# Patient Record
Sex: MALE | Race: WHITE
[De-identification: names, ages, dates, MRNs, and addresses within clinical notes are randomized per-mention and may not be internally consistent; named-entity substitution may affect disease eponyms.]

---

## 2019-11-13 ENCOUNTER — HOSPITAL ENCOUNTER (INPATIENT)
Dept: HOSPITAL 25 - ED | Age: 54
LOS: 4 days | Discharge: HOME | DRG: 174 | End: 2019-11-17
Attending: INTERNAL MEDICINE | Admitting: HOSPITALIST
Payer: COMMERCIAL

## 2019-11-13 DIAGNOSIS — Z79.01: ICD-10-CM

## 2019-11-13 DIAGNOSIS — Z79.02: ICD-10-CM

## 2019-11-13 DIAGNOSIS — Z91.14: ICD-10-CM

## 2019-11-13 DIAGNOSIS — D68.61: ICD-10-CM

## 2019-11-13 DIAGNOSIS — I73.9: ICD-10-CM

## 2019-11-13 DIAGNOSIS — Z28.21: ICD-10-CM

## 2019-11-13 DIAGNOSIS — R73.03: ICD-10-CM

## 2019-11-13 DIAGNOSIS — I95.9: ICD-10-CM

## 2019-11-13 DIAGNOSIS — F32.9: ICD-10-CM

## 2019-11-13 DIAGNOSIS — Z91.041: ICD-10-CM

## 2019-11-13 DIAGNOSIS — I25.42: ICD-10-CM

## 2019-11-13 DIAGNOSIS — I25.10: ICD-10-CM

## 2019-11-13 DIAGNOSIS — J44.9: ICD-10-CM

## 2019-11-13 DIAGNOSIS — R07.89: ICD-10-CM

## 2019-11-13 DIAGNOSIS — I21.09: Primary | ICD-10-CM

## 2019-11-13 DIAGNOSIS — Z95.820: ICD-10-CM

## 2019-11-13 DIAGNOSIS — Z80.1: ICD-10-CM

## 2019-11-13 DIAGNOSIS — E78.5: ICD-10-CM

## 2019-11-13 DIAGNOSIS — I10: ICD-10-CM

## 2019-11-13 DIAGNOSIS — F17.210: ICD-10-CM

## 2019-11-13 DIAGNOSIS — I51.9: ICD-10-CM

## 2019-11-13 LAB
ALBUMIN SERPL BCG-MCNC: 4.3 G/DL (ref 3.2–5.2)
ALBUMIN/GLOB SERPL: 1.3 {RATIO} (ref 1–3)
ALP SERPL-CCNC: 52 U/L (ref 34–104)
ALT SERPL W P-5'-P-CCNC: 23 U/L (ref 7–52)
ANION GAP SERPL CALC-SCNC: 8 MMOL/L (ref 2–11)
APTT PPP: 29 SECONDS (ref 26–38)
AST SERPL-CCNC: 20 U/L (ref 13–39)
BASOPHILS # BLD AUTO: 0.1 10^3/UL (ref 0–0.2)
BUN SERPL-MCNC: 17 MG/DL (ref 6–24)
BUN/CREAT SERPL: 13.9 (ref 8–20)
CALCIUM SERPL-MCNC: 9.6 MG/DL (ref 8.6–10.3)
CHLORIDE SERPL-SCNC: 100 MMOL/L (ref 101–111)
CHOLEST SERPL-MCNC: 248 MG/DL
CK MB SERPL-MCNC: 2.9 NG/ML (ref 0.6–6.3)
CK SERPL-CCNC: 79 U/L (ref 10–223)
EOSINOPHIL # BLD AUTO: 0.3 10^3/UL (ref 0–0.6)
GLOBULIN SER CALC-MCNC: 3.3 G/DL (ref 2–4)
GLUCOSE SERPL-MCNC: 135 MG/DL (ref 70–100)
HCO3 SERPL-SCNC: 26 MMOL/L (ref 22–32)
HCT VFR BLD AUTO: 38 % (ref 42–52)
HDLC SERPL-MCNC: 25 MG/DL
HGB BLD-MCNC: 13.2 G/DL (ref 14–18)
INR PPP/BLD: 1 (ref 0.82–1.09)
LYMPHOCYTES # BLD AUTO: 3 10^3/UL (ref 1–4.8)
MCH RBC QN AUTO: 31 PG (ref 27–31)
MCHC RBC AUTO-ENTMCNC: 35 G/DL (ref 31–36)
MCV RBC AUTO: 90 FL (ref 80–94)
MONOCYTES # BLD AUTO: 1.2 10^3/UL (ref 0–0.8)
NEUTROPHILS # BLD AUTO: 5.6 10^3/UL (ref 1.5–7.7)
NRBC # BLD AUTO: 0 10^3/UL
NRBC BLD QL AUTO: 0
PLATELET # BLD AUTO: 318 10^3/UL (ref 150–450)
POTASSIUM SERPL-SCNC: 3.4 MMOL/L (ref 3.5–5)
PROT SERPL-MCNC: 7.6 G/DL (ref 6.4–8.9)
RBC # BLD AUTO: 4.25 10^6 /UL (ref 4.18–5.48)
SODIUM SERPL-SCNC: 134 MMOL/L (ref 135–145)
TRIGL SERPL-MCNC: 254 MG/DL
TROPONIN I SERPL-MCNC: 0.09 NG/ML (ref ?–0.04)
WBC # BLD AUTO: 10.2 10^3/UL (ref 3.5–10.8)

## 2019-11-13 PROCEDURE — C1769 GUIDE WIRE: HCPCS

## 2019-11-13 PROCEDURE — 80048 BASIC METABOLIC PNL TOTAL CA: CPT

## 2019-11-13 PROCEDURE — 99157 MOD SED OTHER PHYS/QHP EA: CPT

## 2019-11-13 PROCEDURE — 82550 ASSAY OF CK (CPK): CPT

## 2019-11-13 PROCEDURE — 83880 ASSAY OF NATRIURETIC PEPTIDE: CPT

## 2019-11-13 PROCEDURE — 85610 PROTHROMBIN TIME: CPT

## 2019-11-13 PROCEDURE — 87641 MR-STAPH DNA AMP PROBE: CPT

## 2019-11-13 PROCEDURE — C1887 CATHETER, GUIDING: HCPCS

## 2019-11-13 PROCEDURE — 83874 ASSAY OF MYOGLOBIN: CPT

## 2019-11-13 PROCEDURE — C1876 STENT, NON-COA/NON-COV W/DEL: HCPCS

## 2019-11-13 PROCEDURE — 82553 CREATINE MB FRACTION: CPT

## 2019-11-13 PROCEDURE — 85025 COMPLETE CBC W/AUTO DIFF WBC: CPT

## 2019-11-13 PROCEDURE — 93005 ELECTROCARDIOGRAM TRACING: CPT

## 2019-11-13 PROCEDURE — 027035Z DILATION OF CORONARY ARTERY, ONE ARTERY WITH TWO DRUG-ELUTING INTRALUMINAL DEVICES, PERCUTANEOUS APPROACH: ICD-10-PCS | Performed by: INTERNAL MEDICINE

## 2019-11-13 PROCEDURE — C1725 CATH, TRANSLUMIN NON-LASER: HCPCS

## 2019-11-13 PROCEDURE — 83605 ASSAY OF LACTIC ACID: CPT

## 2019-11-13 PROCEDURE — 93306 TTE W/DOPPLER COMPLETE: CPT

## 2019-11-13 PROCEDURE — B2111ZZ FLUOROSCOPY OF MULTIPLE CORONARY ARTERIES USING LOW OSMOLAR CONTRAST: ICD-10-PCS | Performed by: INTERNAL MEDICINE

## 2019-11-13 PROCEDURE — 83735 ASSAY OF MAGNESIUM: CPT

## 2019-11-13 PROCEDURE — 99285 EMERGENCY DEPT VISIT HI MDM: CPT

## 2019-11-13 PROCEDURE — 99223 1ST HOSP IP/OBS HIGH 75: CPT

## 2019-11-13 PROCEDURE — 84484 ASSAY OF TROPONIN QUANT: CPT

## 2019-11-13 PROCEDURE — 85730 THROMBOPLASTIN TIME PARTIAL: CPT

## 2019-11-13 PROCEDURE — 99156 MOD SED OTH PHYS/QHP 5/>YRS: CPT

## 2019-11-13 PROCEDURE — 80053 COMPREHEN METABOLIC PANEL: CPT

## 2019-11-13 PROCEDURE — 36415 COLL VENOUS BLD VENIPUNCTURE: CPT

## 2019-11-13 PROCEDURE — 71045 X-RAY EXAM CHEST 1 VIEW: CPT

## 2019-11-13 PROCEDURE — 85347 COAGULATION TIME ACTIVATED: CPT

## 2019-11-13 PROCEDURE — 83721 ASSAY OF BLOOD LIPOPROTEIN: CPT

## 2019-11-13 PROCEDURE — 4A023N7 MEASUREMENT OF CARDIAC SAMPLING AND PRESSURE, LEFT HEART, PERCUTANEOUS APPROACH: ICD-10-PCS | Performed by: INTERNAL MEDICINE

## 2019-11-13 PROCEDURE — 80061 LIPID PANEL: CPT

## 2019-11-13 PROCEDURE — 81003 URINALYSIS AUTO W/O SCOPE: CPT

## 2019-11-13 PROCEDURE — 83036 HEMOGLOBIN GLYCOSYLATED A1C: CPT

## 2019-11-13 RX ADMIN — FENTANYL CITRATE PRN MCG: 0.05 INJECTION, SOLUTION INTRAMUSCULAR; INTRAVENOUS at 23:51

## 2019-11-13 NOTE — ED
HPI Chest Pain





- HPI Summary


HPI Summary: 


Patient is a 53 y/o M presenting to [facility] via EMS for STEMI. Patient had sudden 

onset of 9/10 crushing, substernal chest pain onset 1 hour and 15 minutes PTA 

tonight, 11/13/19, while the patient was watching TV. SOB and diaphoresis are 

endorsed as well. Patient called EMS 45 minutes PTA. EMS 12 lead was diagnostic 

for STEMI.  and 3 nitro SL were administered. Patient rates current pain 

6/10. 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 

Patient is supposed to be on Xarelto but stopped taking this and all his other 

medications two years ago. Patient has two stents placed in his right leg. He 

is a current 2 pack a day smoker. Home medications and allergies are reviewed. 








- History of Current Complaint


Hx Obtained From: Patient


Onset/Duration: Started Hours Ago, Still Present


Timing: Constant, Lasting Hours


Initial Severity: Severe


Current Severity: Moderate


Pain Scale Used: 0-10 Numeric


Chest Pain Location: Lower Sternal


Character: Crushing


Associated Signs and Symptoms: Positive: Chest Pain, Shortness of Breath, 

Diaphoresis





- Allergy/Home Medications


Allergies/Adverse Reactions: 


 Allergies











Allergy/AdvReac Type Severity Reaction Status Date / Time


 


Iodinated Contrast Media Allergy  Swelling Verified 11/13/19 22:02





   Of  





   Face,Lips,&  





   Throat  











Home Medications: 


 Home Medications





NK [No Home Medications Reported]  11/13/19 [History Confirmed 11/13/19]











PMH/Surg Hx/FS Hx/Imm Hx


Endocrine/Hematology History: 


   Denies: Hx Diabetes, Hx Thyroid Disease


Cardiovascular History: Reports: Hx Hypertension - PT CURRENTLY NOT ON MEDS


Respiratory History: 


   Denies: Hx Asthma, Hx Chronic Obstructive Pulmonary Disease (COPD)


GI History: 


   Denies: Hx Ulcer





- Surgical History


Surgery Procedure, Year, and Place: LEG SURGERY FOR BLOOD CLOTS ("BLEW THE 

CLOTS OUT AND PUT A STENT IN")


Infectious Disease History: 


   Denies: Hx Hepatitis, Hx Human Immunodeficiency Virus (HIV)





- Family History


Known Family History: Positive: Other - Liver cancer - father





- Social History


Alcohol Use: None


Substance Use Type: Reports: None


Smoking Status (MU): Heavy Every Day Tobacco Smoker


Type: Cigarettes


Amount Used/How Often: 1ppd





Review of Systems


Positive: Skin Diaphoresis


Positive: Chest Pain


Positive: Shortness Of Breath


All Other Systems Reviewed And Are Negative: Yes





Physical Exam





- Summary


Physical Exam Summary: 


General: Well-developed, Well-nourished Male. He appears older than stated age. 

Patient presents in moderate discomfort.


HEENT: Normocephalic, Atraumatic. 


              Eyes: Conjuctiva normal, PERRL.


              Ears: TMs within normal limits.


              Nares: (-) discharge, (-) erythema.


              Oropharynx: Clear, mucous membranes moist, (-) exudates. 


Neck: Soft, FROM, (-) lymphadenopathy, (-) thyromegaly, (-) JVD.


Cardiovascular: Normal sinus rhythm, (-) murmur.


Lungs: Clear to auscultation bilaterally (-) wheezes, (-) rales, (-) rhonchi.


Abdomen: Soft, non-tender, non-distended, (-) organomegaly, normal bowel sounds.


Back: (-) CVA tenderness


Extremities: No edema.


Skin: Warm, dry, (-) rash.


Neuro: Alert and oriented x3, no focal deficits.


Psychiatric: Mood normal, affect normal.


 








Triage Information Reviewed: Yes


Vital Signs On Initial Exam: 





 Initial Vitals











Pulse Resp BP Pulse Ox


 


 91   19   176/116   99 


 


 11/13/19 21:24  11/13/19 21:24  11/13/19 21:24  11/13/19 21:24











Vital Signs Reviewed: Yes





Procedures





- Sedation


Patient Received Moderate/Deep Sedation with Procedure: No





Diagnostics





- Laboratory


Result Diagrams: 


 11/13/19 21:38





 11/13/19 21:38


Lab Statement: Any lab studies that have been ordered have been reviewed, and 

results considered in the medical decision making process.





- EKG


  ** 2128


Cardiac Rate: NL - rate of 92 BPM


EKG Rhythm: Sinus Rhythm


ST Segment: Other


Summary of EKG Findings: EKG was positive for STEMI. ST elevations noted in V1-

V4. This EKG was reviewed and interpreted by Dr. Conner.





Chest Pain Course/Dx





- Course


Course Of Treatment: 54-year-old male presents from home by ambulance with 

sudden onset of severe chest pain.  Accompanied by shortness of breath and 

diaphoresis.  No cardiac history but multiple risk factors. EMS administered 

324 ASA and 3 nitro SL. STEMI alert was called.During ED course, patient 

received Brilinta 180 mg PO, Nitro Drip 25,000 mcg in 250 mls, Heparin Drip 25,

000 units in 500 mls, and Heparin Bolus 4000 units IV. patient met in the 

emergency room by Dr. alaniz.  Taken to the Cath Lab for STEMI.





- Diagnoses


Provider Diagnoses: 


 STEMI (ST elevation myocardial infarction)





During the Visit The Following Alert/Code Occurred: STEMI - STEMI called on 

overhead at 2108 with ETA of approximately 20 minutes. 2121 - EMS arrived with 

patient, provider in room to evaluate immediately.





- Provider Notifications


Discussed Care Of Patient With: Chidi Jaramillo


Time Discussed With Above Provider: 21:26


Instructed by Provider To: Other - 2126 - Patient's case was discussed with Dr. Jaramillo, Dr. Jaramillo en-route to ED. 2145 - Dr. Jaramillo evaluated the patient, 

patient was taken to cath lab.





Discharge ED





- Sign-Out/Discharge


Documenting (check all that apply): Patient Departure - admit





- Discharge Plan


Condition: Guarded


Disposition: ADMITTED TO [facility]





- Billing Disposition and Condition


Condition: GUARDED


Disposition: Admitted to [facility]





- Attestation Statements


Document Initiated by Cleoe: Yes


Documenting Scribe: ANTONIO SIMMONS


Provider For Whom Scribe is Documenting (Include Credential): TENZIN CONNER MD


Scribe Attestation: 


I, ANTONIO SIMMONS, scribed for TENZIN CONNER MD on 11/14/19 at 0004. 


Scribe Documentation Reviewed: Yes


Provider Attestation: 


The documentation as recorded by the guanacoibANTONIO ramirez accurately reflects 

the service I personally performed and the decisions made by me, TENZIN CONNER MD


Status of Scribe Document: Viewed

## 2019-11-14 LAB
ANION GAP SERPL CALC-SCNC: 8 MMOL/L (ref 2–11)
BUN SERPL-MCNC: 19 MG/DL (ref 6–24)
BUN/CREAT SERPL: 19 (ref 8–20)
CALCIUM SERPL-MCNC: 9.3 MG/DL (ref 8.6–10.3)
CHLORIDE SERPL-SCNC: 102 MMOL/L (ref 101–111)
CK MB SERPL-MCNC: 224.1 NG/ML (ref 0.6–6.3)
CK MB SERPL-MCNC: 296 NG/ML (ref 0.6–6.3)
CK MB SERPL-MCNC: > 300 NG/ML (ref 0.6–6.3)
CK SERPL-CCNC: 1530 U/L (ref 10–223)
CK SERPL-CCNC: 2195 U/L (ref 10–223)
CK SERPL-CCNC: 2489 U/L (ref 10–223)
GLUCOSE SERPL-MCNC: 148 MG/DL (ref 70–100)
HCO3 SERPL-SCNC: 23 MMOL/L (ref 22–32)
MAGNESIUM SERPL-MCNC: 1.9 MG/DL (ref 1.9–2.7)
POTASSIUM SERPL-SCNC: 4.2 MMOL/L (ref 3.5–5)
SODIUM SERPL-SCNC: 133 MMOL/L (ref 135–145)
TROPONIN I SERPL-MCNC: 16.85 NG/ML (ref ?–0.04)
TROPONIN I SERPL-MCNC: 67.81 NG/ML (ref ?–0.04)
TROPONIN I SERPL-MCNC: > 74 NG/ML (ref ?–0.04)
TROPONIN I SERPL-MCNC: > 83 NG/ML (ref ?–0.04)

## 2019-11-14 RX ADMIN — METOPROLOL TARTRATE SCH MG: 25 TABLET, FILM COATED ORAL at 00:06

## 2019-11-14 RX ADMIN — ACETAMINOPHEN PRN MG: 325 TABLET ORAL at 16:46

## 2019-11-14 RX ADMIN — TICAGRELOR SCH MG: 90 TABLET ORAL at 09:46

## 2019-11-14 RX ADMIN — CAPTOPRIL SCH MG: 12.5 TABLET ORAL at 13:38

## 2019-11-14 RX ADMIN — FENTANYL CITRATE PRN MCG: 0.05 INJECTION, SOLUTION INTRAMUSCULAR; INTRAVENOUS at 06:28

## 2019-11-14 RX ADMIN — OXYCODONE HYDROCHLORIDE AND ACETAMINOPHEN PRN TAB: 5; 325 TABLET ORAL at 02:39

## 2019-11-14 RX ADMIN — ASPIRIN SCH MG: 81 TABLET, CHEWABLE ORAL at 09:46

## 2019-11-14 RX ADMIN — ENOXAPARIN SODIUM SCH MG: 40 INJECTION SUBCUTANEOUS at 21:13

## 2019-11-14 RX ADMIN — NICOTINE SCH PATCH: 21 PATCH TRANSDERMAL at 15:03

## 2019-11-14 RX ADMIN — ATORVASTATIN CALCIUM SCH MG: 80 TABLET, FILM COATED ORAL at 00:06

## 2019-11-14 RX ADMIN — CAPTOPRIL SCH MG: 12.5 TABLET ORAL at 21:12

## 2019-11-14 RX ADMIN — METOPROLOL TARTRATE SCH MG: 25 TABLET, FILM COATED ORAL at 06:04

## 2019-11-14 RX ADMIN — METOPROLOL TARTRATE SCH MG: 50 TABLET, FILM COATED ORAL at 21:12

## 2019-11-14 RX ADMIN — TEMAZEPAM PRN MG: 15 CAPSULE ORAL at 21:12

## 2019-11-14 RX ADMIN — NITROGLYCERIN PRN MG: 0.4 TABLET SUBLINGUAL at 13:41

## 2019-11-14 RX ADMIN — WATER SCH NOTE: 100 INJECTION, SOLUTION INTRAVENOUS at 21:13

## 2019-11-14 RX ADMIN — NITROGLYCERIN PRN MG: 0.4 TABLET SUBLINGUAL at 16:46

## 2019-11-14 RX ADMIN — TICAGRELOR SCH MG: 90 TABLET ORAL at 21:12

## 2019-11-14 RX ADMIN — ENOXAPARIN SODIUM SCH MG: 40 INJECTION SUBCUTANEOUS at 00:07

## 2019-11-14 RX ADMIN — ATORVASTATIN CALCIUM SCH MG: 80 TABLET, FILM COATED ORAL at 16:38

## 2019-11-14 NOTE — PN
Subjective


Date of Service: 11/14/19 - anterior STEMI s/p PCI


Interval History: 





Patient [place] 11[place]13/2019 [place] [facility] [place] 1.5 hours of ongoing substernal chest 

pain (ache) radiating down left arm with diaphoresis. Underwent urgent LHC due 

to anterior STEMI. No recurrent c/o chest pain since. Does c/o SOB. No dizziness

, right radial access pain or palpitations. Right radial access site had scant 

oozing this morning thus, TR band still in place





Medications


Active Medications: 








Acetaminophen (Tylenol Tab*)  650 mg PO Q4H PRN


   PRN Reason: MILD PAIN or TEMP > 100.4


Aspirin (Aspirin 81 Mg Chew Tab*)  81 mg PO DAILY [facility]


Atorvastatin Calcium (Lipitor*)  80 mg PO DAILY@1700 [facility]


   Last Admin: 11/14/19 00:06 Dose:  80 mg


Enoxaparin Sodium (Lovenox(*))  40 mg SUBCUT BEDTIME [facility]


   Last Admin: 11/14/19 00:07 Dose:  40 mg


Fentanyl Citrate (Fentanyl*)  75 mcg 0.75 mcg/kg (71.5 mcg) IV Q4H PRN


   PRN Reason: PAIN - SEVERE


   Last Admin: 11/14/19 06:28 Dose:  75 mcg


Metoprolol Tartrate (Lopressor Tab*)  25 mg PO Q6HR [facility]


   Last Admin: 11/14/19 06:04 Dose:  25 mg


Nitroglycerin (Nitroglycerin Tab 0.4 Mg*)  0.4 mg SL Q5M PRN


   PRN Reason: ANGINA


Oxycodone/Acetaminophen (Percocet 5/325 Tab*)  1 tab PO Q6H PRN


   PRN Reason: PAIN - MODERATE


   Last Admin: 11/14/19 02:39 Dose:  1 tab


Ticagrelor (Brilinta*)  90 mg PO BID [facility]








Objective


Vital Signs:











Temp Pulse Resp BP Pulse Ox


 


 97.3 F   80   18   168/105   98 


 


 11/14/19 08:00  11/14/19 09:00  11/14/19 09:00  11/14/19 09:00  11/14/19 09:00











Oxygen Devices in Use Now: None


Appearance: SOB with conversation, Alert and oriented. cooperative.


Ears/Nose/Mouth/Throat: NL Teeth, Lips, Gums, Mucous Membranes Moist


Neck: NL Appearance and Movements; NL JVP, Trachea Midline


Respiratory: Symmetrical Chest Expansion and Respiratory Effort, Clear to 

Auscultation


Cardiovascular: NL Sounds; No Murmurs; No JVD, No Edema


Abdominal: NL Sounds; No Tenderness; No Distention


Extremities: - - right radial access site examined. TR band in place( deflated)

. cap refill < 3 seconds. 2+ right radial pulse. no thrill or hematoma. 


Neurological: Alert and Oriented x 3


Lines/Tubes/Other Access: Clean, Dry and Intact Peripheral IV


Laboratory Results: 


 





 11/13/19 21:38 





 11/14/19 04:25 





 











INR (Anticoag Therapy)  1.00  (0.82-1.09)   11/13/19  21:38    


 


APTT  29.0 seconds (26.0-38.0)   11/13/19  21:38    


 


Total Bilirubin  0.50 mg/dL (0.2-1.0)   11/13/19  21:38    


 


AST  20 U/L (13-39)   11/13/19  21:38    


 


ALT  23 U/L (7-52)   11/13/19  21:38    


 


Alkaline Phosphatase  52 U/L ()   11/13/19  21:38    


 


CK-MB (CK-2)  > 300.0 ng/mL (0.6-6.3)  H  11/14/19  04:25    


 


B-Natriuretic Peptide  62 pg/mL (<=100)   11/13/19  21:38    


 


Total Protein  7.6 g/dL (6.4-8.9)   11/13/19  21:38    


 


Albumin  4.3 g/dL (3.2-5.2)   11/13/19  21:38    


 


Globulin  3.3 g/dL (2-4)   11/13/19  21:38    


 


Albumin/Globulin Ratio  1.3  (1-3)   11/13/19  21:38    


 


Triglycerides  254 mg/dL  11/13/19  21:38    


 


Cholesterol  248 mg/dL  11/13/19  21:38    


 


LDL Cholesterol  172 mg/dL  11/13/19  21:38    


 


HDL Cholesterol  25.0 mg/dL  11/13/19  21:38    








 











  11/13/19 11/14/19 11/14/19





  21:38 00:28 04:25


 


Troponin I  0.09 H*  16.85 H*  67.81 H*








 Laboratory Results - last 24 hr











  11/13/19 11/13/19 11/13/19





  21:38 21:38 21:38


 


WBC  10.2  


 


RBC  4.25  


 


Hgb  13.2 L  


 


Hct  38 L  


 


MCV  90  


 


MCH  31  


 


MCHC  35  


 


RDW  15  


 


Plt Count  318  


 


MPV  7.7  


 


Neut % (Auto)  55.2  


 


Lymph % (Auto)  29.9  


 


Mono % (Auto)  11.6  


 


Eos % (Auto)  2.7  


 


Baso % (Auto)  0.6  


 


Absolute Neuts (auto)  5.6  


 


Absolute Lymphs (auto)  3.0  


 


Absolute Monos (auto)  1.2 H  


 


Absolute Eos (auto)  0.3  


 


Absolute Basos (auto)  0.1  


 


Absolute Nucleated RBC  0.0  


 


Nucleated RBC %  0.0  


 


INR (Anticoag Therapy)   1.00 


 


APTT   29.0 


 


POC Activ Clotting Time   


 


Sodium    134 L


 


Potassium    3.4 L


 


Chloride    100 L


 


Carbon Dioxide    26


 


Anion Gap    8


 


BUN    17


 


Creatinine    1.22 H


 


Est GFR ( Amer)    74.9


 


Est GFR (Non-Af Amer)    61.9


 


BUN/Creatinine Ratio    13.9


 


Glucose    135 H


 


Hemoglobin A1c   


 


Lactic Acid   


 


Calcium    9.6


 


Total Bilirubin    0.50


 


AST    20


 


ALT    23


 


Alkaline Phosphatase    52


 


Total Creatine Kinase    79


 


CK-MB (CK-2)    2.9


 


Myoglobin    129.5 H


 


Troponin I    0.09 H*


 


B-Natriuretic Peptide   


 


Total Protein    7.6


 


Albumin    4.3


 


Globulin    3.3


 


Albumin/Globulin Ratio    1.3


 


Triglycerides    254


 


Cholesterol    248


 


LDL Cholesterol    172


 


LDL Cholesterol Direct    196


 


HDL Cholesterol    25.0














  11/13/19 11/13/19 11/13/19





  21:38 21:38 21:38


 


WBC   


 


RBC   


 


Hgb   


 


Hct   


 


MCV   


 


MCH   


 


MCHC   


 


RDW   


 


Plt Count   


 


MPV   


 


Neut % (Auto)   


 


Lymph % (Auto)   


 


Mono % (Auto)   


 


Eos % (Auto)   


 


Baso % (Auto)   


 


Absolute Neuts (auto)   


 


Absolute Lymphs (auto)   


 


Absolute Monos (auto)   


 


Absolute Eos (auto)   


 


Absolute Basos (auto)   


 


Absolute Nucleated RBC   


 


Nucleated RBC %   


 


INR (Anticoag Therapy)   


 


APTT   


 


POC Activ Clotting Time   


 


Sodium   


 


Potassium   


 


Chloride   


 


Carbon Dioxide   


 


Anion Gap   


 


BUN   


 


Creatinine   


 


Est GFR ( Amer)   


 


Est GFR (Non-Af Amer)   


 


BUN/Creatinine Ratio   


 


Glucose   


 


Hemoglobin A1c    6.1 H


 


Lactic Acid  2.7 H*  


 


Calcium   


 


Total Bilirubin   


 


AST   


 


ALT   


 


Alkaline Phosphatase   


 


Total Creatine Kinase   


 


CK-MB (CK-2)   


 


Myoglobin   


 


Troponin I   


 


B-Natriuretic Peptide   62 


 


Total Protein   


 


Albumin   


 


Globulin   


 


Albumin/Globulin Ratio   


 


Triglycerides   


 


Cholesterol   


 


LDL Cholesterol   


 


LDL Cholesterol Direct   


 


HDL Cholesterol   














  11/13/19 11/13/19 11/14/19





  22:16 22:32 00:28


 


WBC   


 


RBC   


 


Hgb   


 


Hct   


 


MCV   


 


MCH   


 


MCHC   


 


RDW   


 


Plt Count   


 


MPV   


 


Neut % (Auto)   


 


Lymph % (Auto)   


 


Mono % (Auto)   


 


Eos % (Auto)   


 


Baso % (Auto)   


 


Absolute Neuts (auto)   


 


Absolute Lymphs (auto)   


 


Absolute Monos (auto)   


 


Absolute Eos (auto)   


 


Absolute Basos (auto)   


 


Absolute Nucleated RBC   


 


Nucleated RBC %   


 


INR (Anticoag Therapy)   


 


APTT   


 


POC Activ Clotting Time  219  255 


 


Sodium   


 


Potassium   


 


Chloride   


 


Carbon Dioxide   


 


Anion Gap   


 


BUN   


 


Creatinine   


 


Est GFR ( Amer)   


 


Est GFR (Non-Af Amer)   


 


BUN/Creatinine Ratio   


 


Glucose   


 


Hemoglobin A1c   


 


Lactic Acid   


 


Calcium   


 


Total Bilirubin   


 


AST   


 


ALT   


 


Alkaline Phosphatase   


 


Total Creatine Kinase    1530 H


 


CK-MB (CK-2)    224.1 H


 


Myoglobin   


 


Troponin I    16.85 H*


 


B-Natriuretic Peptide   


 


Total Protein   


 


Albumin   


 


Globulin   


 


Albumin/Globulin Ratio   


 


Triglycerides   


 


Cholesterol   


 


LDL Cholesterol   


 


LDL Cholesterol Direct   


 


HDL Cholesterol   














  11/14/19





  04:25


 


WBC 


 


RBC 


 


Hgb 


 


Hct 


 


MCV 


 


MCH 


 


MCHC 


 


RDW 


 


Plt Count 


 


MPV 


 


Neut % (Auto) 


 


Lymph % (Auto) 


 


Mono % (Auto) 


 


Eos % (Auto) 


 


Baso % (Auto) 


 


Absolute Neuts (auto) 


 


Absolute Lymphs (auto) 


 


Absolute Monos (auto) 


 


Absolute Eos (auto) 


 


Absolute Basos (auto) 


 


Absolute Nucleated RBC 


 


Nucleated RBC % 


 


INR (Anticoag Therapy) 


 


APTT 


 


POC Activ Clotting Time 


 


Sodium  133 L


 


Potassium  4.2


 


Chloride  102


 


Carbon Dioxide  23


 


Anion Gap  8


 


BUN  19


 


Creatinine  1.00


 


Est GFR ( Amer)  94.2


 


Est GFR (Non-Af Amer)  77.9


 


BUN/Creatinine Ratio  19.0


 


Glucose  148 H


 


Hemoglobin A1c 


 


Lactic Acid 


 


Calcium  9.3


 


Total Bilirubin 


 


AST 


 


ALT 


 


Alkaline Phosphatase 


 


Total Creatine Kinase  2489 H


 


CK-MB (CK-2)  > 300.0 H


 


Myoglobin 


 


Troponin I  67.81 H*


 


B-Natriuretic Peptide 


 


Total Protein 


 


Albumin 


 


Globulin 


 


Albumin/Globulin Ratio 


 


Triglycerides 


 


Cholesterol 


 


LDL Cholesterol 


 


LDL Cholesterol Direct 


 


HDL Cholesterol 











Diagnostic Imaging: 





[facility] 11/13/2019; Proximal LAD 90%, Lcx mid 50%, RCA PDA small with diffuse 

plaque. Patient underwent MARY ELLEN to proximal LAD with 3X16mm MARY ELLEN distal edge 

dissection covered with 3X8mm MARY ELLEN


EKG Data: 





11/14/2019; NSR rate 80 with anterior ST elevation in V1-3 with bisphasic TW. 





Telemetry; reviewed. NSR rate 65-75. 





Assessment/Plan





#1 s/p Anterior STEMI 11/13/2019. Patient presented after 1.5 hours of onset of 

symptoms. Underwent successful MARY ELLEN/ Proximal LAD complicated by distal edge 

dissection covered with 3X8mm MARY ELLEN. No recurrent c/o chest pain (ache) Does 

report dyspnea. Lungs are clear on exam. ? due to Brilinta. I asked that he 

drink caffeine prior to taking Brilinta this morning. He is on ASA 81/day, 

Brilinta 90mg Po BID, Lipitor and Lopressor therapy. ECG revealed anterior ST 

elevation in V1-V3 with associated biphasic TW changes. Troponin has not peaked 

thus will cycle enzymes. Patient will need echo. LV gram not performed due to 

reported contrast allergy, he is to have echo updated today. In the past he was 

non compliant with medications however, he reports he will be compliant from 

now on.





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. Per 

patient he underwent stenting to right popliteal in 2011 and in 2012 had a 

recurrent DVT. He reports history of Lupus anticoagulant historically saw Dr. Reed. He stopped taking Xarelto 2 years ago. ( cites ignorance as to why he 

stopped medication, denies bleeding complication). Will consult hematology 

given patient is now on DAPT for above #1. Will continue Lovenox for the time 

being. Records requested from [facility] and Dr. Reed's office





#3 h/o HLD; LDL this admit 172. Now on Lipitor 80QHS. Will need repeat FLP/LFT 

in 6-8 weeks.





#4 HTN; on Lopressor 25mg PO Q6H. He recieved 2 doses. Will convert dosing to 

50mg Po BID and uptitrate accordingly. Heart rate 65-70 on telemetry. He may 

benefit from ACEI. Will await echo. Renal function normalized with hydration. 





#5 Disposition pending course. Patient full code. Will d/w Dr Jaramillo. 


Attending: Chidi Jaramillo

## 2019-11-14 NOTE — CONSULT
<Jennie Barnes - Last Filed: 11/14/19 17:35>





Consultation





- Reason for Consultation


Reason for Consultation: 


Lupus Anticardiolipin antibody, Anticoagulation recommendation





Ordering Provider: Chidi Jaramillo


Chief Complaint: 


Chest pain





History of Present Illness: 


Mr. Catalan is known to Hematology due to his history of recurrent arterial 

thrombosis of the lower extremities.  This was initially treated @ [facility] and 

he was seen by Dr. Reed in July of 2012.  He was initially treated with 

arterial stenting and coumadin, however, had recurrent thrombus and was 

transitioned to Arixta.  Mr. Catalan  struggled with the injections and in May 

2013 transition to Xarelto 20 mg daily.  He was followed routinely until July of 2015 at which time he was lost to f/u for unclear reasons.





Presented to the hospital with several days of chest pain, found to have an 

anterior STEMI and subsequently had catheterization with stent placement.  Per 

cardiology blockage 2/2 plaque, rupture with thrombis NOT felt to be emboli 

given progressive symptoms over 3 weeks.





Mr. Catalan tells me he stopped taking his anticoagulant as he was "sick of the 

doctor and you had to see the doctor to get it."  States he is exhausted as he 

has not slept since last night.  Chest pain persists though nitro is helping, 

"They tell me they couldn't open one so its the only thing that will help it to 

finally push through."








Allergies/Medications


Medication: 


Home Medications:


Not currently taking any meds





Current Medications:


Acetaminophen (Tylenol Tab*)  650 mg PO Q4H PRN


   PRN Reason: MILD PAIN or TEMP > 100.4


Aspirin (Aspirin 81 Mg Chew Tab*)  81 mg PO DAILY [facility]


   Last Admin: 11/14/19 09:46 Dose:  81 mg


Atorvastatin Calcium (Lipitor*)  80 mg PO DAILY@1700 [facility]


   Last Admin: 11/14/19 00:06 Dose:  80 mg


Captopril (Capoten Tab*)  6.25 mg PO TID [facility]


Enoxaparin Sodium (Lovenox(*))  40 mg SUBCUT BEDTIME [facility]


   Last Admin: 11/14/19 00:07 Dose:  40 mg


Metoprolol Tartrate (Lopressor Tab*)  50 mg PO BID [facility]


Nicotine (Nicotine Patch 21 Mg/24 Hr*)  1 patch TRANSDERM DAILY@0800 [facility]


Nitroglycerin (Nitroglycerin Tab 0.4 Mg*)  0.4 mg SL Q5M PRN


   PRN Reason: ANGINA


Oxycodone/Acetaminophen (Percocet 5/325 Tab*)  1 tab PO Q6H PRN


   PRN Reason: PAIN - MODERATE


   Last Admin: 11/14/19 02:39 Dose:  1 tab


Pharmacy Profile Note (Nicotine Patch Removal Note*)  1 note FOLLOW UP 0600 [facility]


Temazepam (Restoril Cap*)  15 mg PO BEDTIME PRN


   PRN Reason: INSOMNIA


Ticagrelor (Brilinta*)  90 mg PO BID [facility]


   Last Admin: 11/14/19 09:46 Dose:  90 mg








Allergies/Adverse Reactions: 


 Allergies











Allergy/AdvReac Type Severity Reaction Status Date / Time


 


Iodinated Contrast Media Allergy  Swelling Verified 11/13/19 22:02





   Of  





   Face,Lips,&  





   Throat  














History





- Past Medical History


Hx Circulatory Problems: Yes - PVD


Hx Hypertension: Yes


Other History: COPD.  Depression





- Family History


Hx Family Cancer: Yes - father lung cancer, non-smoker


Other Family History: no history of blood clots in family





- Social History


Hx Alcohol Use: No


Hx Tobacco Use: Yes


Marital Status: 


Number of Children: 4





Review of Systems





- Review of Systems


Constitutional Symptoms: Positive: Fatigue


Dermatology: Positive: Normal


HEENT: Positive: Normal


Eyes: Positive: Normal


Pulmonary: Positive: Normal


Cardiology: Positive: Chest Pain


Gastroenterology: Positive: Normal


Hematologic/Lymphatic: Positive: Other - Lupus anticardiolipin antibody


Neurology: Positive: Other - Forgetful





Physical Exam





- Physical Exam


Physical Examination: 


A&Ox3, EOMI, neuro grossly non-focal


- request things be written down d/t forgetfulness


HRR, S1S2, SR on tele


LS clear bilat. with even and non-labored resp.


+BS, soft and non-tender


Rwrist cath site benign


+PP=bilat, no peripheral edema noted


Slightly flushed cheeks








Results





- Lab Results


Lab Results: 


 











  11/13/19 11/13/19 11/13/19





  21:38 21:38 21:38


 


WBC  10.2  


 


RBC  4.25  


 


Hgb  13.2 L  


 


Hct  38 L  


 


MCV  90  


 


MCH  31  


 


MCHC  35  


 


RDW  15  


 


Plt Count  318  


 


MPV  7.7  


 


Neut % (Auto)  55.2  


 


Lymph % (Auto)  29.9  


 


Mono % (Auto)  11.6  


 


Eos % (Auto)  2.7  


 


Baso % (Auto)  0.6  


 


Absolute Neuts (auto)  5.6  


 


Absolute Lymphs (auto)  3.0  


 


Absolute Monos (auto)  1.2 H  


 


Absolute Eos (auto)  0.3  


 


Absolute Basos (auto)  0.1  


 


Absolute Nucleated RBC  0.0  


 


Nucleated RBC %  0.0  


 


INR (Anticoag Therapy)   1.00 


 


APTT   29.0 


 


POC Activ Clotting Time   


 


Sodium    134 L


 


Potassium    3.4 L


 


Chloride    100 L


 


Carbon Dioxide    26


 


Anion Gap    8


 


BUN    17


 


Creatinine    1.22 H


 


Est GFR ( Amer)    74.9


 


Est GFR (Non-Af Amer)    61.9


 


BUN/Creatinine Ratio    13.9


 


Glucose    135 H


 


Hemoglobin A1c   


 


Lactic Acid   


 


Calcium    9.6


 


Total Bilirubin    0.50


 


AST    20


 


ALT    23


 


Alkaline Phosphatase    52


 


Total Creatine Kinase    79


 


CK-MB (CK-2)    2.9


 


Myoglobin    129.5 H


 


Troponin I    0.09 H*


 


B-Natriuretic Peptide   


 


Total Protein    7.6


 


Albumin    4.3


 


Globulin    3.3


 


Albumin/Globulin Ratio    1.3


 


Triglycerides    254


 


Cholesterol    248


 


LDL Cholesterol    172


 


LDL Cholesterol Direct    196


 


HDL Cholesterol    25.0


 


Urine Color   


 


Urine Appearance   


 


Urine pH   


 


Ur Specific Gravity   


 


Urine Protein   


 


Urine Ketones   


 


Urine Blood   


 


Urine Nitrate   


 


Urine Bilirubin   


 


Urine Urobilinogen   


 


Ur Leukocyte Esterase   


 


Urine Glucose   














  11/13/19 11/13/19 11/13/19





  21:38 21:38 21:38


 


WBC   


 


RBC   


 


Hgb   


 


Hct   


 


MCV   


 


MCH   


 


MCHC   


 


RDW   


 


Plt Count   


 


MPV   


 


Neut % (Auto)   


 


Lymph % (Auto)   


 


Mono % (Auto)   


 


Eos % (Auto)   


 


Baso % (Auto)   


 


Absolute Neuts (auto)   


 


Absolute Lymphs (auto)   


 


Absolute Monos (auto)   


 


Absolute Eos (auto)   


 


Absolute Basos (auto)   


 


Absolute Nucleated RBC   


 


Nucleated RBC %   


 


INR (Anticoag Therapy)   


 


APTT   


 


POC Activ Clotting Time   


 


Sodium   


 


Potassium   


 


Chloride   


 


Carbon Dioxide   


 


Anion Gap   


 


BUN   


 


Creatinine   


 


Est GFR ( Amer)   


 


Est GFR (Non-Af Amer)   


 


BUN/Creatinine Ratio   


 


Glucose   


 


Hemoglobin A1c    6.1 H


 


Lactic Acid  2.7 H*  


 


Calcium   


 


Total Bilirubin   


 


AST   


 


ALT   


 


Alkaline Phosphatase   


 


Total Creatine Kinase   


 


CK-MB (CK-2)   


 


Myoglobin   


 


Troponin I   


 


B-Natriuretic Peptide   62 


 


Total Protein   


 


Albumin   


 


Globulin   


 


Albumin/Globulin Ratio   


 


Triglycerides   


 


Cholesterol   


 


LDL Cholesterol   


 


LDL Cholesterol Direct   


 


HDL Cholesterol   


 


Urine Color   


 


Urine Appearance   


 


Urine pH   


 


Ur Specific Gravity   


 


Urine Protein   


 


Urine Ketones   


 


Urine Blood   


 


Urine Nitrate   


 


Urine Bilirubin   


 


Urine Urobilinogen   


 


Ur Leukocyte Esterase   


 


Urine Glucose   














  11/13/19 11/13/19 11/14/19





  22:16 22:32 00:28


 


WBC   


 


RBC   


 


Hgb   


 


Hct   


 


MCV   


 


MCH   


 


MCHC   


 


RDW   


 


Plt Count   


 


MPV   


 


Neut % (Auto)   


 


Lymph % (Auto)   


 


Mono % (Auto)   


 


Eos % (Auto)   


 


Baso % (Auto)   


 


Absolute Neuts (auto)   


 


Absolute Lymphs (auto)   


 


Absolute Monos (auto)   


 


Absolute Eos (auto)   


 


Absolute Basos (auto)   


 


Absolute Nucleated RBC   


 


Nucleated RBC %   


 


INR (Anticoag Therapy)   


 


APTT   


 


POC Activ Clotting Time  219  255 


 


Sodium   


 


Potassium   


 


Chloride   


 


Carbon Dioxide   


 


Anion Gap   


 


BUN   


 


Creatinine   


 


Est GFR ( Amer)   


 


Est GFR (Non-Af Amer)   


 


BUN/Creatinine Ratio   


 


Glucose   


 


Hemoglobin A1c   


 


Lactic Acid   


 


Calcium   


 


Total Bilirubin   


 


AST   


 


ALT   


 


Alkaline Phosphatase   


 


Total Creatine Kinase    1530 H


 


CK-MB (CK-2)    224.1 H


 


Myoglobin   


 


Troponin I    16.85 H*


 


B-Natriuretic Peptide   


 


Total Protein   


 


Albumin   


 


Globulin   


 


Albumin/Globulin Ratio   


 


Triglycerides   


 


Cholesterol   


 


LDL Cholesterol   


 


LDL Cholesterol Direct   


 


HDL Cholesterol   


 


Urine Color   


 


Urine Appearance   


 


Urine pH   


 


Ur Specific Gravity   


 


Urine Protein   


 


Urine Ketones   


 


Urine Blood   


 


Urine Nitrate   


 


Urine Bilirubin   


 


Urine Urobilinogen   


 


Ur Leukocyte Esterase   


 


Urine Glucose   














  11/14/19 11/14/19 11/14/19





  04:25 09:15 09:45


 


WBC   


 


RBC   


 


Hgb   


 


Hct   


 


MCV   


 


MCH   


 


MCHC   


 


RDW   


 


Plt Count   


 


MPV   


 


Neut % (Auto)   


 


Lymph % (Auto)   


 


Mono % (Auto)   


 


Eos % (Auto)   


 


Baso % (Auto)   


 


Absolute Neuts (auto)   


 


Absolute Lymphs (auto)   


 


Absolute Monos (auto)   


 


Absolute Eos (auto)   


 


Absolute Basos (auto)   


 


Absolute Nucleated RBC   


 


Nucleated RBC %   


 


INR (Anticoag Therapy)   


 


APTT   


 


POC Activ Clotting Time   


 


Sodium  133 L  


 


Potassium  4.2  


 


Chloride  102  


 


Carbon Dioxide  23  


 


Anion Gap  8  


 


BUN  19  


 


Creatinine  1.00  


 


Est GFR ( Amer)  94.2  


 


Est GFR (Non-Af Amer)  77.9  


 


BUN/Creatinine Ratio  19.0  


 


Glucose  148 H  


 


Hemoglobin A1c   


 


Lactic Acid   


 


Calcium  9.3  


 


Total Bilirubin   


 


AST   


 


ALT   


 


Alkaline Phosphatase   


 


Total Creatine Kinase  2489 H   2195 H


 


CK-MB (CK-2)  > 300.0 H   296.0 H


 


Myoglobin   


 


Troponin I  67.81 H*   > 74.00 H*


 


B-Natriuretic Peptide   


 


Total Protein   


 


Albumin   


 


Globulin   


 


Albumin/Globulin Ratio   


 


Triglycerides   


 


Cholesterol   


 


LDL Cholesterol   


 


LDL Cholesterol Direct   


 


HDL Cholesterol   


 


Urine Color   Straw 


 


Urine Appearance   Clear 


 


Urine pH   6.0 


 


Ur Specific Gravity   1.015 


 


Urine Protein   Negative 


 


Urine Ketones   Negative 


 


Urine Blood   Negative 


 


Urine Nitrate   Negative 


 


Urine Bilirubin   Negative 


 


Urine Urobilinogen   Negative 


 


Ur Leukocyte Esterase   Negative 


 


Urine Glucose   Negative 














Assessment and Plan


Impression: 


53 yo male with history of recurrent thrombosis with known Lupus 

anticardiolipin antibody admitted to the ICU for anterior STEMI s/p stent on 

dual platelet antibody, fortunately this current event does not appear related 

to acute thrombus.  





Recurrent thrombus risk very high with known antibody, fortunately he has not 

had a life threatening event while off anticoagulation.  There is no 

contraindication to full dose anticoagulation with his current treatment and 

therefore resumption of his Xarelto is most appropriate.  I reviewed this with 

him at length and he understands the reasoning and need for follow-up as an 

outpatient.





Plan: 


Management per Cardiology


Recommend full dose anticoagulation with Xarelto 20 mg daily starting tomorrow 

evening, give with meal





Case reviewed with attending, Dr. Cheney.


Plan for hem. f/u with Dr. Reed as outpatient ~ 1 mo. post d/c to review need 

for life long anticoagulation








<Miladis Cheney - Last Filed: 11/15/19 07:14>





Results





- Lab Results


Lab Results: 


 











  11/13/19 11/13/19 11/13/19





  21:38 21:38 21:38


 


WBC  10.2  


 


RBC  4.25  


 


Hgb  13.2 L  


 


Hct  38 L  


 


MCV  90  


 


MCH  31  


 


MCHC  35  


 


RDW  15  


 


Plt Count  318  


 


MPV  7.7  


 


Neut % (Auto)  55.2  


 


Lymph % (Auto)  29.9  


 


Mono % (Auto)  11.6  


 


Eos % (Auto)  2.7  


 


Baso % (Auto)  0.6  


 


Absolute Neuts (auto)  5.6  


 


Absolute Lymphs (auto)  3.0  


 


Absolute Monos (auto)  1.2 H  


 


Absolute Eos (auto)  0.3  


 


Absolute Basos (auto)  0.1  


 


Absolute Nucleated RBC  0.0  


 


Nucleated RBC %  0.0  


 


INR (Anticoag Therapy)   1.00 


 


APTT   29.0 


 


POC Activ Clotting Time   


 


Sodium    134 L


 


Potassium    3.4 L


 


Chloride    100 L


 


Carbon Dioxide    26


 


Anion Gap    8


 


BUN    17


 


Creatinine    1.22 H


 


Est GFR ( Amer)    74.9


 


Est GFR (Non-Af Amer)    61.9


 


BUN/Creatinine Ratio    13.9


 


Glucose    135 H


 


POC Glucose (mg/dL)   


 


Hemoglobin A1c   


 


Lactic Acid   


 


Calcium    9.6


 


Magnesium   


 


Total Bilirubin    0.50


 


AST    20


 


ALT    23


 


Alkaline Phosphatase    52


 


Total Creatine Kinase    79


 


CK-MB (CK-2)    2.9


 


Myoglobin    129.5 H


 


Troponin I    0.09 H*


 


B-Natriuretic Peptide   


 


Total Protein    7.6


 


Albumin    4.3


 


Globulin    3.3


 


Albumin/Globulin Ratio    1.3


 


Triglycerides    254


 


Cholesterol    248


 


LDL Cholesterol    172


 


LDL Cholesterol Direct    196


 


HDL Cholesterol    25.0


 


Urine Color   


 


Urine Appearance   


 


Urine pH   


 


Ur Specific Gravity   


 


Urine Protein   


 


Urine Ketones   


 


Urine Blood   


 


Urine Nitrate   


 


Urine Bilirubin   


 


Urine Urobilinogen   


 


Ur Leukocyte Esterase   


 


Urine Glucose   














  11/13/19 11/13/19 11/13/19





  21:38 21:38 21:38


 


WBC   


 


RBC   


 


Hgb   


 


Hct   


 


MCV   


 


MCH   


 


MCHC   


 


RDW   


 


Plt Count   


 


MPV   


 


Neut % (Auto)   


 


Lymph % (Auto)   


 


Mono % (Auto)   


 


Eos % (Auto)   


 


Baso % (Auto)   


 


Absolute Neuts (auto)   


 


Absolute Lymphs (auto)   


 


Absolute Monos (auto)   


 


Absolute Eos (auto)   


 


Absolute Basos (auto)   


 


Absolute Nucleated RBC   


 


Nucleated RBC %   


 


INR (Anticoag Therapy)   


 


APTT   


 


POC Activ Clotting Time   


 


Sodium   


 


Potassium   


 


Chloride   


 


Carbon Dioxide   


 


Anion Gap   


 


BUN   


 


Creatinine   


 


Est GFR ( Amer)   


 


Est GFR (Non-Af Amer)   


 


BUN/Creatinine Ratio   


 


Glucose   


 


POC Glucose (mg/dL)   


 


Hemoglobin A1c    6.1 H


 


Lactic Acid  2.7 H*  


 


Calcium   


 


Magnesium   


 


Total Bilirubin   


 


AST   


 


ALT   


 


Alkaline Phosphatase   


 


Total Creatine Kinase   


 


CK-MB (CK-2)   


 


Myoglobin   


 


Troponin I   


 


B-Natriuretic Peptide   62 


 


Total Protein   


 


Albumin   


 


Globulin   


 


Albumin/Globulin Ratio   


 


Triglycerides   


 


Cholesterol   


 


LDL Cholesterol   


 


LDL Cholesterol Direct   


 


HDL Cholesterol   


 


Urine Color   


 


Urine Appearance   


 


Urine pH   


 


Ur Specific Gravity   


 


Urine Protein   


 


Urine Ketones   


 


Urine Blood   


 


Urine Nitrate   


 


Urine Bilirubin   


 


Urine Urobilinogen   


 


Ur Leukocyte Esterase   


 


Urine Glucose   














  11/13/19 11/13/19 11/14/19





  22:16 22:32 00:28


 


WBC   


 


RBC   


 


Hgb   


 


Hct   


 


MCV   


 


MCH   


 


MCHC   


 


RDW   


 


Plt Count   


 


MPV   


 


Neut % (Auto)   


 


Lymph % (Auto)   


 


Mono % (Auto)   


 


Eos % (Auto)   


 


Baso % (Auto)   


 


Absolute Neuts (auto)   


 


Absolute Lymphs (auto)   


 


Absolute Monos (auto)   


 


Absolute Eos (auto)   


 


Absolute Basos (auto)   


 


Absolute Nucleated RBC   


 


Nucleated RBC %   


 


INR (Anticoag Therapy)   


 


APTT   


 


POC Activ Clotting Time  219  255 


 


Sodium   


 


Potassium   


 


Chloride   


 


Carbon Dioxide   


 


Anion Gap   


 


BUN   


 


Creatinine   


 


Est GFR ( Amer)   


 


Est GFR (Non-Af Amer)   


 


BUN/Creatinine Ratio   


 


Glucose   


 


POC Glucose (mg/dL)   


 


Hemoglobin A1c   


 


Lactic Acid   


 


Calcium   


 


Magnesium   


 


Total Bilirubin   


 


AST   


 


ALT   


 


Alkaline Phosphatase   


 


Total Creatine Kinase    1530 H


 


CK-MB (CK-2)    224.1 H


 


Myoglobin   


 


Troponin I    16.85 H*


 


B-Natriuretic Peptide   


 


Total Protein   


 


Albumin   


 


Globulin   


 


Albumin/Globulin Ratio   


 


Triglycerides   


 


Cholesterol   


 


LDL Cholesterol   


 


LDL Cholesterol Direct   


 


HDL Cholesterol   


 


Urine Color   


 


Urine Appearance   


 


Urine pH   


 


Ur Specific Gravity   


 


Urine Protein   


 


Urine Ketones   


 


Urine Blood   


 


Urine Nitrate   


 


Urine Bilirubin   


 


Urine Urobilinogen   


 


Ur Leukocyte Esterase   


 


Urine Glucose   














  11/14/19 11/14/19 11/14/19





  04:25 09:15 09:45


 


WBC   


 


RBC   


 


Hgb   


 


Hct   


 


MCV   


 


MCH   


 


MCHC   


 


RDW   


 


Plt Count   


 


MPV   


 


Neut % (Auto)   


 


Lymph % (Auto)   


 


Mono % (Auto)   


 


Eos % (Auto)   


 


Baso % (Auto)   


 


Absolute Neuts (auto)   


 


Absolute Lymphs (auto)   


 


Absolute Monos (auto)   


 


Absolute Eos (auto)   


 


Absolute Basos (auto)   


 


Absolute Nucleated RBC   


 


Nucleated RBC %   


 


INR (Anticoag Therapy)   


 


APTT   


 


POC Activ Clotting Time   


 


Sodium  133 L  


 


Potassium  4.2  


 


Chloride  102  


 


Carbon Dioxide  23  


 


Anion Gap  8  


 


BUN  19  


 


Creatinine  1.00  


 


Est GFR ( Amer)  94.2  


 


Est GFR (Non-Af Amer)  77.9  


 


BUN/Creatinine Ratio  19.0  


 


Glucose  148 H  


 


POC Glucose (mg/dL)   


 


Hemoglobin A1c   


 


Lactic Acid   


 


Calcium  9.3  


 


Magnesium    1.9


 


Total Bilirubin   


 


AST   


 


ALT   


 


Alkaline Phosphatase   


 


Total Creatine Kinase  2489 H   2195 H


 


CK-MB (CK-2)  > 300.0 H   296.0 H


 


Myoglobin   


 


Troponin I  67.81 H*   > 74.00 H*


 


B-Natriuretic Peptide   


 


Total Protein   


 


Albumin   


 


Globulin   


 


Albumin/Globulin Ratio   


 


Triglycerides   


 


Cholesterol   


 


LDL Cholesterol   


 


LDL Cholesterol Direct   


 


HDL Cholesterol   


 


Urine Color   Straw 


 


Urine Appearance   Clear 


 


Urine pH   6.0 


 


Ur Specific Gravity   1.015 


 


Urine Protein   Negative 


 


Urine Ketones   Negative 


 


Urine Blood   Negative 


 


Urine Nitrate   Negative 


 


Urine Bilirubin   Negative 


 


Urine Urobilinogen   Negative 


 


Ur Leukocyte Esterase   Negative 


 


Urine Glucose   Negative 














  11/14/19 11/15/19 11/15/19





  16:41 02:15 04:00


 


WBC   


 


RBC   


 


Hgb   


 


Hct   


 


MCV   


 


MCH   


 


MCHC   


 


RDW   


 


Plt Count   


 


MPV   


 


Neut % (Auto)   


 


Lymph % (Auto)   


 


Mono % (Auto)   


 


Eos % (Auto)   


 


Baso % (Auto)   


 


Absolute Neuts (auto)   


 


Absolute Lymphs (auto)   


 


Absolute Monos (auto)   


 


Absolute Eos (auto)   


 


Absolute Basos (auto)   


 


Absolute Nucleated RBC   


 


Nucleated RBC %   


 


INR (Anticoag Therapy)   


 


APTT   


 


POC Activ Clotting Time   


 


Sodium    134 L


 


Potassium    4.1


 


Chloride    104


 


Carbon Dioxide    24


 


Anion Gap    6


 


BUN    17


 


Creatinine    0.92


 


Est GFR ( Amer)    103.7


 


Est GFR (Non-Af Amer)    85.7


 


BUN/Creatinine Ratio    18.5


 


Glucose    120 H


 


POC Glucose (mg/dL)   114 H 


 


Hemoglobin A1c   


 


Lactic Acid   


 


Calcium    9.4


 


Magnesium   


 


Total Bilirubin   


 


AST   


 


ALT   


 


Alkaline Phosphatase   


 


Total Creatine Kinase   


 


CK-MB (CK-2)   


 


Myoglobin   


 


Troponin I  > 83.00 H*  


 


B-Natriuretic Peptide   


 


Total Protein   


 


Albumin   


 


Globulin   


 


Albumin/Globulin Ratio   


 


Triglycerides   


 


Cholesterol   


 


LDL Cholesterol   


 


LDL Cholesterol Direct   


 


HDL Cholesterol   


 


Urine Color   


 


Urine Appearance   


 


Urine pH   


 


Ur Specific Gravity   


 


Urine Protein   


 


Urine Ketones   


 


Urine Blood   


 


Urine Nitrate   


 


Urine Bilirubin   


 


Urine Urobilinogen   


 


Ur Leukocyte Esterase   


 


Urine Glucose   














Assessment and Plan


Plan: 


case discussed with NP Cameron at length.  Though xeralto not ideal for [employer], the 

patient has already failed coumadin and refuses injections.  compliance clearly 

critical, though fortunately he has not had clots while off of anticoagulation. 

Agree with full dose xeralto and follow up with Dr. Reed as outpatient.

## 2019-11-14 NOTE — HP
*** AMENDED REPORT NOW INCLUDES DESIGNATED COSIGNER - ESIGNED BEFORE 
ADJUSTMENTS ***



CC:  Dr. Ramirez *

 

HISTORY AND PHYSICAL:

 

DATE OF ADMISSION:  11/13/19

 

ATTENDING PHYSICIAN:  Dr. Chidi Jaramillo, Interventional Cardiology.* (DICTATED 
BY SCOTTIE DEL CASTILLO NP)

 

PRIMARY CARE PHYSICIAN:  Dr. Ramirez.

 

PRIMARY HEMATOLOGIST/ONCOLOGIST:  In the past, Dr. Reed.

 

CHIEF COMPLAINT:  Chest pain, diaphoresis.

 

HISTORY OF PRESENT ILLNESS:  This is a 54-year-old gentleman with a notable 
history of hypertension, hyperlipidemia, lupus anticardiolipin antibody, 
history of right lower extremity arterial clot  in 2011.  The patient reports 
at that time he underwent stenting. According to Dr. Reed's outpatient medical 
records, he had re-thrombosis while on Coumadin in 2012.  The patient was 
treated with Xarelto therapy; however, due to medication noncompliance, he 
stopped taking Xarelto in 2017.  He also suffers from COPD and peripheral 
vascular disease.

 

The patient states that he has been in his usual state of health up until about 
3 weeks ago when he started to develop resting chest pain, described as a 
toothache, located substernally, radiating down his left arm.  Episodes would 
occur once a week lasting 10 to 15 minutes and would improve with position 
change, not related to activity.  Since last week, episode frequency and 
intensity have been increasing.  Apparently, he has been experiencing episodes 
2 to 3 times since last week.  He states that last night around 8 p.m., while 
watching TV, he started to develop substernal chest pain described as an ache; 
however, intensity was severe. Pain radiated down his left arm with associated 
diaphoresis.  Pain was constant and ongoing, thus at 9:15 he called 911 and was 
transferred to [facility]. He was evaluated urgently in the 
emergency room where he was diagnosed with an anterior STEMI.  The patient was 
given IV nitroglycerin, IV fentanyl, IV heparin therapy with a 4000-unit 
loading dose and was loaded with Brilinta 180 mg a day and taken urgently to 
the cath lab.  While in the cath lab, he had a wide complex rhythm, did not 
require defibrillation.  He underwent successful drug-eluting stent placement 
to proximal LAD.  There was a distal dissection covered with 3 x 8 mm drug-
eluting stent.  Diag was jailed per Dr. Chidi Jaramillo.  He was transferred to 
the ICU where he has been monitored since.  He denies any recurrent chest pain.
  He does report dyspnea.  Denies dizziness, lightheadedness, or palpitations.  
He offers no complaints at this time.

 

PAST MEDICAL HISTORY:

1.  right lower extremity arterial clot in 2011 with re-thrombosis, on Coumadin 
in 2012.

2.  Peripheral vascular disease.

3.  Lupus anticardiolipin antibody.

4.  Hypertension.

5.  Hyperlipidemia.

6.  Ongoing tobacco abuse.

7.  Medication noncompliance.

8.  COPD.

 

PAST SURGICAL HISTORY:  According to the patient, he underwent stenting to 
right lower extremity in 2011.  Medical records from [facility] have been 
requested.

 

HOME MEDICATIONS:  None.  The patient reports stopping Xarelto therapy 2 years 
ago due to noncompliance.

 

ALLERGIES:  Include IV CONTRAST DYE, which according to the patient causes 
hives.

 

FAMILY HISTORY:  Noncontributory.

 

SOCIAL HISTORY:  The patient is single, lives with his parents and daughter.  
He smokes 2 packs per day for the last 30 years.  Denies alcohol use or drug 
use.  He is unemployed.

 

REVIEW OF SYSTEMS:  All systems have been reviewed and otherwise negative 
except as above mentioned in the HPI.

 

                               PHYSICAL EXAMINATION

 

GENERAL:  The patient is sitting upright in bed, cooperative with the exam, 
appears in no apparent distress, A and O x3.

 

HEENT:  Head is atraumatic, normocephalic.  Oral mucosa is moist.  Tongue is 
midline.

 

NECK:  Supple.  Trachea midline.  No JVD.  No carotid bruits.

 

LUNGS:  Auscultated posteriorly.  No evidence of adventitious breath sounds. 
Respirations are unlabored.

 

CARDIAC:  Normal S1, S2.  Regular rate and rhythm.  No murmur, gallop, or rub 
noted.

 

/GI:  Bowel sounds are hyperactive in all 4 quadrants.  No hepatomegaly with 
palpation.

 

EXTREMITIES:  2+ dorsalis pedis pulses palpated bilaterally and symmetrically.  
3+ left radial pulse.  Right radial pulse 2+.  No thrill.  TR band in place.  
Cap refill less than 3 seconds.

 

 DIAGNOSTIC STUDIES/LAB DATA:  Blood work from 11/13/19 reviewed.  INR 1.  
Chemistry from 11/14/19:  Sodium 133, potassium 4.2, chloride 102, carbon 
dioxide 23, BUN 19, creatinine 1, glucose 148.  Hemoglobin A1c 6.1%.  Troponin 
greater than 74, has not peaked.  .  White count 10.2, hemoglobin 13.2, 
hematocrit 38, platelets 318.

 

ECG from 11/13/19 at 2126:  Sinus rhythm, rate 92 with ST segment depression 
noted in V1 through V4 with reciprocal changes noted in II, III and aVF.

 

ASSESSMENT AND PLAN:

1.  Acute anterior ST-segment elevation myocardial infarction.  The patient 
presented to [facility] 1.5 hours after onset of symptoms.  He was 
taken urgently to the cath lab where he underwent drug-eluting stent placement 
to proximal LAD.  There was distal edge dissection covered with 3 x 8 mm drug-
eluting stent and reported jailed diag.  We will continue to cycle isoenzymes.  
Troponin has not peaked.  Last isoenzyme was greater than 74.  LV gram was not 
performed due to reported allergy to CONTRAST DYE.  We will update 
echocardiogram.  He has not had any recurrent complaints of chest pain since 
PCI.  He is on aspirin 81 mg a day in combination with Brilinta 90 mg p.o. 
b.i.d.  We will continue Lopressor therapy and high-intensity statin therapy.  
The patient will need to be seen by Hematology/Oncology due to history of lupus 
anticardiolipin antibody with prior right lower extremity arterial thrombosis 
with re-thrombosis on Coumadin in 2012, historically was on Xarelto therapy, 
but has stopped medication due to reported noncompliance.  Ideally, we 
recommend dual antiplatelet therapy uninterrupted for 12 months' time due to 
presentation of ST-elevation myocardial infarction.  Given complexity of case 
and possible requirement of triple therapy, again we will involve Hematology/
Oncology.

2.  History of lupus anticardiolipin antibody with prior right lower extremity 
arterial thrombosis in 2011 with re-thrombosis on Coumadin in 2012 and reported 
right lower extremity vascular stent at [facility].  We will request records from 
[facility] and involve Hematology.  Continue Lovenox therapy at this time.
  Right radial access is intact.  No bleeding complications.

3.  History of hypertension.  We will continue Lopressor 50 mg p.o. b.i.d. and 
initiate ACE inhibitor therapy.  Goal blood pressure is less than 130/80.

4.  History of hyperlipidemia.  We will continue high-intensity statin therapy. 
Goal LDL is less than 70.  The patient will need repeat outpatient fasting 
lipid panel and liver function tests in 6 to 8 weeks' time.

5.  Disposition:  Pending course.

 

Dr. Chidi Jaramillo has personally seen and examined the patient and agrees with 
the above assessment and plan.

 

 ____________________________________ SCOTTIE DEL CASTILLO, NP

 

[id number] #: [id number]

[facility]

## 2019-11-14 NOTE — ECHO
*[facility]*

[place] [address]

Phone: [phone]

Fax #: [phone]



-------------------------------------------------------------------

Transthoracic Echocardiogram



Patient: Neto Catalan                  MRN:        [id number]

:     1965                         Study Date: 2019

Age:     54                                 Accession#: [id number]

Gender:  M                                  HR:         81 bpm

Height:  71 in /180.3 cm                    BSA:        2.15 m^2

Weight:  209.6 lb /95.3 kg                  BMI:        29.3 kg/m^2



*Sonographer: * Maureen Escobar RDCS RN

 

*Referring Physician: * Chidi Jaramillo MD

*Reading Physician: * Maghaydah, Qutaybeh MD



-------------------------------------------------------------------

Indications:  Myocardial Infarction (new). S/P PCI.



-------------------------------------------------------------------

History:  Blood clots of the right leg with stenting. Factor V

deficiency.  Risk factors:   Current tobacco use. Hypertension.



-------------------------------------------------------------------

Conclusions



Summary:



- Left ventricle: The cavity size is normal. Wall thickness is

  mildly to moderately increased. Systolic function is mildly

  reduced. The estimated ejection fraction is 45-50%. Hypokinesis

  of the mid-apicalanteroseptal myocardium. Hypokinesis of the

  apicalinferior myocardium. Hypokinesis of the apicallateral

  myocardium. Hypokinesis of the apicalanterior myocardium.

- Right ventricle: Hypokinesis of the RV apex.

- Mitral valve: There is trace regurgitation.

- No previous echocardiogram available.



-------------------------------------------------------------------

Study data:  Transthoracic echocardiogram.  Procedure:

Transthoracic echocardiography was performed. Image quality was

fair. The study was technically limited due to Smoking history.

Intravenous Definity 4 ml was administered by SHELL Escobar RN, RDCS to

enhance imaging.  Complete 2D, spectral Doppler, and color flow

Doppler.  Location:  Bedside. Patient room number: ICU 8.  Rhythm:

Normal sinus rhythm with PVC&apos;s.



-------------------------------------------------------------------

Findings



Left ventricle:  The cavity size is normal. Wall thickness is

mildly to moderately increased. Systolic function is mildly

reduced. The estimated ejection fraction is 45-50%.  Regional wall

motion abnormalities:   Hypokinesis of the mid-apicalanteroseptal

myocardium.  Hypokinesis of the apicalinferior myocardium.

Hypokinesis of the apicallateral myocardium.  Hypokinesis of the

apicalanterior myocardium. Doppler parameters are consistent with

abnormal left ventricular relaxation (grade 1 diastolic

dysfunction).

Right ventricle:  The cavity size is normal. Systolic function is

normal.  Hypokinesis of the RV apex.

Left atrium:  The atrium is normal in size.

Right atrium:  The atrium is normal in size.

Mitral valve:  The leaflets are normal thickness.  There is no

evidence of stenosis.   There is trace regurgitation.

Aortic valve:   The valve is trileaflet. The leaflets are mildly

thickened.  There is no evidence of stenosis.   There is no

significant regurgitation.

Tricuspid valve:  The leaflets are normal thickness.  There is no

evidence of stenosis.   There is trace regurgitation.

Pulmonic valve:   The leaflets are normal thickness.  There is no

evidence of stenosis.   There is trace regurgitation.

Aorta:  Aortic root: The aortic root is appears normal.

Ascending aorta: The ascending aorta is appears normal.

Aortic arch: The aortic arch is not dilated.

Pericardium:  There is no significant pericardial effusion.

Pulmonary arteries:

The main pulmonary artery is normal-sized.  Systolic pressure can

not be accurately estimated.

Systemic veins:  Not visualized.



-------------------------------------------------------------------

Measurements



 Left ventricle            Value        Ref         Aortic valve              Value       Ref

 KENYA, LAX                  4.6   cm     4.2 - 5.8   Hoa diam, ED              1.7   cm    ----

 ESD, LAX                  3.8   cm     2.5 - 4.0   Peak v, S                 1.27  m/sec ----

 FS, LAX          (L)      17    %      25 - 43     VTI, S                    21.3  cm    ----

 PW, ED           (H)      1.2   cm     0.6 - 1.0   Mean grad, S              4.0   mm Hg ----

 IVS/PW, ED                1.12         ----------  Peak grad, S              6.0   mm Hg ----

 E&apos;, lat hoa, TDI (L)      7.3   cm/sec &gt;=10.0      LVOT/AV, VTI ratio        0.96        ---
-

 E/e&apos;, lat hoa,            8            ----------

 TDI                                                Mitral valve              Value       Ref

 E&apos;, med hoa, TDI (L)      6.6   cm/sec &gt;=7.0       Peak E                    0.6   m/sec ---
-

 E/e&apos;, med hoa,            9            ----------  Peak A                    0.8   m/sec ----

 TDI                                                Decel time                211   ms    ----

 E&apos;, avg, TDI              7.0   cm/sec ----------  Peak E/A ratio            0.8         ----

 E/e&apos;, avg, TDI            9            &lt;=14

                                                    Pulmonic valve            Value       Ref

 LVOT                      Value        Ref         Peak v, S                 0.98  m/sec ----

 Peak jakob, S               1.08  m/sec  ----------  Peak grad, S              4.0   mm Hg ----

 VTI, S                    20.4  cm     ----------

 Mean grad, S              3     mm Hg  ----------  Aortic root               Value       Ref

                                                    Root diam                 3.1   cm    &lt;4.3

 Ventricular septum        Value        Ref

 IVS, ED          (H)      1.4   cm     0.6 - 1.0   Ascending aorta           Value       Ref

                                                    AAo AP diam, S            3.0   cm    ----

 Right ventricle           Value        Ref

 KENYA minor ax,             2.9   cm     1.9 - 3.5   Aortic arch               Value       Ref

 A4C mid                                            Arch diam                 2.9   cm    ----

 

 Left atrium               Value        Ref         Decending aorta           Value       Ref

 AP dim, ES                3.50  cm     3.00 -      Ana Luisa peak jkaob              0.73  m/sec ----

                                        4.00

 ML dim, A4C               4.1   cm     ----------

 SI dim, A4C               4.5   cm     ----------

 Vol/bsa, ES, 1-p          18    ml/m^2 12 - 37

 A4C

 Vol/bsa, ES, A/L          31    ml/m^2 16 - 34

 

 Right atrium              Value        Ref

 ML dim, ES, A4C           3.7   cm     2.6 - 4.4

 SI dim, ES, A4C           4.4   cm     3.4 - 5.3

 

Legend:

(L)  and  (H)  allison values outside specified reference range.



Prepared and electronically signed by



Maghaydah, Qutaybeh MD

2019 09:53

## 2019-11-15 LAB
ANION GAP SERPL CALC-SCNC: 6 MMOL/L (ref 2–11)
BUN SERPL-MCNC: 17 MG/DL (ref 6–24)
BUN/CREAT SERPL: 18.5 (ref 8–20)
CALCIUM SERPL-MCNC: 9.4 MG/DL (ref 8.6–10.3)
CHLORIDE SERPL-SCNC: 104 MMOL/L (ref 101–111)
GLUCOSE SERPL-MCNC: 120 MG/DL (ref 70–100)
HCO3 SERPL-SCNC: 24 MMOL/L (ref 22–32)
POTASSIUM SERPL-SCNC: 4.1 MMOL/L (ref 3.5–5)
SODIUM SERPL-SCNC: 134 MMOL/L (ref 135–145)
TROPONIN I SERPL-MCNC: 43.35 NG/ML (ref ?–0.04)

## 2019-11-15 RX ADMIN — CAPTOPRIL SCH MG: 12.5 TABLET ORAL at 08:12

## 2019-11-15 RX ADMIN — CAPTOPRIL SCH MG: 12.5 TABLET ORAL at 20:14

## 2019-11-15 RX ADMIN — METOPROLOL TARTRATE SCH MG: 50 TABLET, FILM COATED ORAL at 08:12

## 2019-11-15 RX ADMIN — ATORVASTATIN CALCIUM SCH MG: 80 TABLET, FILM COATED ORAL at 16:54

## 2019-11-15 RX ADMIN — METOPROLOL TARTRATE SCH MG: 50 TABLET, FILM COATED ORAL at 20:14

## 2019-11-15 RX ADMIN — NICOTINE SCH PATCH: 21 PATCH TRANSDERMAL at 08:10

## 2019-11-15 RX ADMIN — TICAGRELOR SCH MG: 90 TABLET ORAL at 08:13

## 2019-11-15 RX ADMIN — ACETAMINOPHEN PRN MG: 325 TABLET ORAL at 14:20

## 2019-11-15 RX ADMIN — ASPIRIN SCH MG: 81 TABLET, CHEWABLE ORAL at 08:12

## 2019-11-15 RX ADMIN — WATER SCH NOTE: 100 INJECTION, SOLUTION INTRAVENOUS at 20:15

## 2019-11-15 RX ADMIN — OXYCODONE HYDROCHLORIDE AND ACETAMINOPHEN PRN TAB: 5; 325 TABLET ORAL at 02:59

## 2019-11-15 RX ADMIN — RIVAROXABAN SCH MG: 20 TABLET, FILM COATED ORAL at 16:55

## 2019-11-15 RX ADMIN — CAPTOPRIL SCH MG: 12.5 TABLET ORAL at 14:20

## 2019-11-15 NOTE — PN
Progress Note





- Progress Note


Date of Service: 11/15/19


SOAP: 


Subjective:


[]doing well, no chest pain. Walking around floor w/o difficulty. Hoping for 

discharge on Sunday.   











Acetaminophen (Tylenol Tab*)  650 mg PO Q4H PRN


   PRN Reason: MILD PAIN or TEMP > 100.4


   Last Admin: 11/15/19 14:20 Dose:  650 mg


Aspirin (Aspirin 81 Mg Chew Tab*)  81 mg PO DAILY [facility]


   Last Admin: 11/15/19 08:12 Dose:  81 mg


Atorvastatin Calcium (Lipitor*)  80 mg PO DAILY@1700 [facility]


   Last Admin: 11/15/19 16:54 Dose:  80 mg


Captopril (Capoten Tab*)  12.5 mg PO TID [facility]


   Last Admin: 11/15/19 14:20 Dose:  12.5 mg


Clopidogrel Bisulfate (Plavix Tab*)  600 mg PO ONCE ONE


   Stop: 11/15/19 20:01


Clopidogrel Bisulfate (Plavix Tab*)  75 mg PO DAILY [facility]


Metoprolol Tartrate (Lopressor Tab*)  50 mg PO BID [facility]


   Last Admin: 11/15/19 08:12 Dose:  50 mg


Nicotine (Nicotine Patch 21 Mg/24 Hr*)  1 patch TRANSDERM DAILY@0800 [facility]


   Last Admin: 11/15/19 08:10 Dose:  1 patch


Nitroglycerin (Nitroglycerin Tab 0.4 Mg*)  0.4 mg SL Q5M PRN


   PRN Reason: ANGINA


   Last Admin: 11/14/19 16:46 Dose:  0.4 mg


Oxycodone/Acetaminophen (Percocet 5/325 Tab*)  1 tab PO Q6H PRN


   PRN Reason: PAIN - MODERATE


   Last Admin: 11/15/19 02:59 Dose:  1 tab


Pharmacy Profile Note (Nicotine Patch Removal Note*)  1 note PATCH OFF 2100 [facility]


   Last Admin: 11/14/19 21:13 Dose:  1 note


Rivaroxaban (Xarelto(*))  20 mg PO 1700 [facility]


   Last Admin: 11/15/19 16:55 Dose:  20 mg


Temazepam (Restoril Cap*)  15 mg PO BEDTIME PRN


   PRN Reason: INSOMNIA


   Last Admin: 11/14/19 21:12 Dose:  15 mg














Objective:


[]


 Vital Signs











Temp Pulse Resp BP Pulse Ox


 


 98.0 F   91   18   127/82   98 


 


 11/15/19 15:06  11/15/19 15:06  11/15/19 15:06  11/15/19 15:06  11/15/19 15:06








HEENT: OM moist, no bleeding, no lesions


CTA


RRR S12


+BS NT ND


ext s/p surgery r leg, no edema


skin no bruising








Assessment:


[]54 year old male previously managed on Xarelto for thrombophilia and 

recurrent arterial thrombi. Off therapy for several years and lost to follow 

up. Now presents with AMI. 








Plan:


[]1. Agree with anticoagulation with Plavix, ASA and Xarelto.


2. Take Xarleto at night with food.


3. If discharged over weekend will follow up  next 14 days in clinic.

## 2019-11-15 NOTE — PN
<Rika Porter - Last Filed: 11/15/19 08:48>





Subjective


Date of Service: 11/15/19 - anterior STEMI, h/o arterial thrombosis 


Interval History: 





[place] [facility] [place] of ongoing substernal chest 

pain (ache) radiating down left arm with diaphoresis. Underwent urgent LHC due 

to anterior STEMI resulted in DESx2 to LAD in overlapping fashion with jailed 

diag. Yesterday afternoon started to develop "jabbing" chest pain 5/10 

associated with inspiration not movement. He was given excess sublingual NTG 

accidentally. SBP did go as low as 73 however, he rebounded and has been doing 

well since





Medications


Active Medications: 








Acetaminophen (Tylenol Tab*)  650 mg PO Q4H PRN


   PRN Reason: MILD PAIN or TEMP > 100.4


   Last Admin: 11/14/19 16:46 Dose:  650 mg


Aspirin (Aspirin 81 Mg Chew Tab*)  81 mg PO DAILY [facility]


   Last Admin: 11/15/19 08:12 Dose:  81 mg


Atorvastatin Calcium (Lipitor*)  80 mg PO DAILY@1700 [facility]


   Last Admin: 11/14/19 16:38 Dose:  80 mg


Captopril (Capoten Tab*)  6.25 mg PO TID [facility]


   Last Admin: 11/15/19 08:12 Dose:  6.25 mg


Captopril (Capoten Tab*)  12.5 mg PO TID [facility]


Clopidogrel Bisulfate (Plavix Tab*)  600 mg PO ONCE ONE


   Stop: 11/15/19 20:01


Clopidogrel Bisulfate (Plavix Tab*)  75 mg PO DAILY [facility]


Metoprolol Tartrate (Lopressor Tab*)  50 mg PO BID [facility]


   Last Admin: 11/15/19 08:12 Dose:  50 mg


Nicotine (Nicotine Patch 21 Mg/24 Hr*)  1 patch TRANSDERM DAILY@0800 [facility]


   Last Admin: 11/15/19 08:10 Dose:  1 patch


Nitroglycerin (Nitroglycerin Tab 0.4 Mg*)  0.4 mg SL Q5M PRN


   PRN Reason: ANGINA


   Last Admin: 11/14/19 16:46 Dose:  0.4 mg


Oxycodone/Acetaminophen (Percocet 5/325 Tab*)  1 tab PO Q6H PRN


   PRN Reason: PAIN - MODERATE


   Last Admin: 11/15/19 02:59 Dose:  1 tab


Pharmacy Profile Note (Nicotine Patch Removal Note*)  1 note PATCH OFF 2100 [facility]


   Last Admin: 11/14/19 21:13 Dose:  1 note


Rivaroxaban (Xarelto(*))  20 mg PO 1700 [facility]


Temazepam (Restoril Cap*)  15 mg PO BEDTIME PRN


   PRN Reason: INSOMNIA


   Last Admin: 11/14/19 21:12 Dose:  15 mg








Objective


Vital Signs:











Temp Pulse Resp BP Pulse Ox


 


 98.6 F   79   19   144/107   96 


 


 11/15/19 08:00  11/15/19 08:00  11/15/19 08:00  11/15/19 08:00  11/15/19 08:00











Oxygen Devices in Use Now: None


Appearance: SOB with conversation, Alert and oriented. cooperative.


Ears/Nose/Mouth/Throat: NL Teeth, Lips, Gums, Mucous Membranes Moist


Neck: NL Appearance and Movements; NL JVP, Trachea Midline


Respiratory: Symmetrical Chest Expansion and Respiratory Effort, Clear to 

Auscultation


Cardiovascular: NL Sounds; No Murmurs; No JVD, No Edema


Abdominal: NL Sounds; No Tenderness; No Distention


Extremities: - - right radial access site examined.  cap refill < 3 seconds. 2+ 

right radial pulse. no thrill or hematoma. 


Neurological: Alert and Oriented x 3


Lines/Tubes/Other Access: Clean, Dry and Intact Peripheral IV


Laboratory Results: 


 





 11/13/19 21:38 





 11/15/19 04:00 





 











INR (Anticoag Therapy)  1.00  (0.82-1.09)   11/13/19  21:38    


 


APTT  29.0 seconds (26.0-38.0)   11/13/19  21:38    


 


Total Bilirubin  0.50 mg/dL (0.2-1.0)   11/13/19  21:38    


 


AST  20 U/L (13-39)   11/13/19  21:38    


 


ALT  23 U/L (7-52)   11/13/19  21:38    


 


Alkaline Phosphatase  52 U/L ()   11/13/19  21:38    


 


CK-MB (CK-2)  296.0 ng/mL (0.6-6.3)  H  11/14/19  09:45    


 


B-Natriuretic Peptide  62 pg/mL (<=100)   11/13/19  21:38    


 


Total Protein  7.6 g/dL (6.4-8.9)   11/13/19  21:38    


 


Albumin  4.3 g/dL (3.2-5.2)   11/13/19  21:38    


 


Globulin  3.3 g/dL (2-4)   11/13/19  21:38    


 


Albumin/Globulin Ratio  1.3  (1-3)   11/13/19  21:38    


 


Triglycerides  254 mg/dL  11/13/19  21:38    


 


Cholesterol  248 mg/dL  11/13/19  21:38    


 


LDL Cholesterol  172 mg/dL  11/13/19  21:38    


 


HDL Cholesterol  25.0 mg/dL  11/13/19  21:38    








 











  11/13/19 11/14/19 11/14/19





  21:38 00:28 04:25


 


Troponin I  0.09 H*  16.85 H*  67.81 H*














  11/14/19 11/14/19





  09:45 16:41


 


Troponin I  > 74.00 H*  > 83.00 H*








 Laboratory Results - last 24 hr











  11/14/19 11/14/19 11/14/19





  09:15 09:45 16:41


 


Sodium   


 


Potassium   


 


Chloride   


 


Carbon Dioxide   


 


Anion Gap   


 


BUN   


 


Creatinine   


 


Est GFR ( Amer)   


 


Est GFR (Non-Af Amer)   


 


BUN/Creatinine Ratio   


 


Glucose   


 


POC Glucose (mg/dL)   


 


Calcium   


 


Magnesium   1.9 


 


Total Creatine Kinase   2195 H 


 


CK-MB (CK-2)   296.0 H 


 


Troponin I   > 74.00 H*  > 83.00 H*


 


Urine Color  Straw  


 


Urine Appearance  Clear  


 


Urine pH  6.0  


 


Ur Specific Gravity  1.015  


 


Urine Protein  Negative  


 


Urine Ketones  Negative  


 


Urine Blood  Negative  


 


Urine Nitrate  Negative  


 


Urine Bilirubin  Negative  


 


Urine Urobilinogen  Negative  


 


Ur Leukocyte Esterase  Negative  


 


Urine Glucose  Negative  














  11/15/19 11/15/19





  02:15 04:00


 


Sodium   134 L


 


Potassium   4.1


 


Chloride   104


 


Carbon Dioxide   24


 


Anion Gap   6


 


BUN   17


 


Creatinine   0.92


 


Est GFR ( Amer)   103.7


 


Est GFR (Non-Af Amer)   85.7


 


BUN/Creatinine Ratio   18.5


 


Glucose   120 H


 


POC Glucose (mg/dL)  114 H 


 


Calcium   9.4


 


Magnesium  


 


Total Creatine Kinase  


 


CK-MB (CK-2)  


 


Troponin I  


 


Urine Color  


 


Urine Appearance  


 


Urine pH  


 


Ur Specific Gravity  


 


Urine Protein  


 


Urine Ketones  


 


Urine Blood  


 


Urine Nitrate  


 


Urine Bilirubin  


 


Urine Urobilinogen  


 


Ur Leukocyte Esterase  


 


Urine Glucose  











Diagnostic Imaging: 





[facility] 11/13/2019; Proximal LAD 90%, Lcx mid 50%, RCA PDA small with diffuse 

plaque. Patient underwent MARY ELLEN to proximal LAD with 3X16mm MARY ELLEN distal edge 

dissection covered with 3X8mm MARY ELLEN.














Doppler.  Location:  Bedside. Patient room number: ICU 8.  Rhythm:


Normal sinus rhythm with PVC&apos;s.


-------------------------------------------------------------------


Findings





Left ventricle:  The cavity size is normal. Wall thickness is


mildly to moderately increased. Systolic function is mildly


reduced. The estimated ejection fraction is 45-50%.  Regional wall


motion abnormalities:   Hypokinesis of the mid-apicalanteroseptal


myocardium.  Hypokinesis of the apicalinferior myocardium.


Hypokinesis of the apicallateral myocardium.  Hypokinesis of the


apicalanterior myocardium. Doppler parameters are consistent with


abnormal left ventricular relaxation (grade 1 diastolic


dysfunction).


Right ventricle:  The cavity size is normal. Systolic function is


normal.  Hypokinesis of the RV apex.


Left atrium:  The atrium is normal in size.


Right atrium:  The atrium is normal in size.


Mitral valve:  The leaflets are normal thickness.  There is no


evidence of stenosis.   There is trace regurgitation.


Aortic valve:   The valve is trileaflet. The leaflets are mildly


thickened.  There is no evidence of stenosis.   There is no


significant regurgitation.


Tricuspid valve:  The leaflets are normal thickness.  There is no


evidence of stenosis.   There is trace regurgitation.


Pulmonic valve:   The leaflets are normal thickness.  There is no


evidence of stenosis.   There is trace regurgitation.


Aorta:  Aortic root: The aortic root is appears normal.


Ascending aorta: The ascending aorta is appears normal.


Aortic arch: The aortic arch is not dilated.


Pericardium:  There is no significant pericardial effusion.


Pulmonary arteries:


The main pulmonary artery is normal-sized.  Systolic pressure can


not be accurately estimated.


Systemic veins:  Not visualized.


-------------------------------------------------------------------


Measurements





 Left ventricle            Value        Ref         Aortic valve              

Value       


Ref


 KENYA, LAX                  4.6   cm     4.2 - 5.8   Aliyah diam, ED              

1.7   cm    


----


 ESD, LAX                  3.8   cm     2.5 - 4.0   Peak v, S                 

1.27  m/


sec ----


 FS, LAX          (L)      17    %      25 - 43     VTI, S                    

21.3  cm    


----





This report is only to be considered final once signed by the Provider(s) as 

displayed in the "<Electronically Signed by >" field (s). Absence of a 


signature indicates the report is in a draft status and still needs to be 

finalized. In the event this document was created by someone other than the 


signing Provider, the individual initiating the document will be listed in the 

"Entered by:" or "Dictated by:" fields.





EKG Data: 





11/14/2019; NSR rate 80 with anterior ST elevation in V1-3 with bisphasic TW. 





11/15/2019; NSR rate 76 Anterior ST elevation with TWI consistent with recent 

MI. 





Telemetry; reviewed. NSR rate 65-75. 6 beat count NSVT 11/14/2019 non since





Assessment/Plan





#1 s/p Anterior STEMI 11/13/2019. Patient presented after 1.5 hours of onset of 

symptoms. Underwent successful MARY ELLEN/ Proximal LAD complicated by distal edge 

dissection covered with 3X8mm MARY ELLEN, jailed Diag. LVEF 45-50%. On ASA 81/day and /

Brilinta 90mg PO BID. Appreciate hematology consult. Given need for OAC will 

discontinue Brilinta and start Plavix. Last dose of Brilinta was this morning 

at 0800 thus will start Plavix 600mg PO x1 at 2000 today, then resume Plavix 

75mg/day starting 11/16/2019. He will need ASA 81/day in combination with 

Plavix 75mg/day and Xarelto 20mg/day x30 days. After 30 days stop ASA 81/day 

and CONTINUE Plavix 75mg/day in combination with Xarelto 20mg/day. It was 

decided to do 30 days of triple therapy due to MARY ELLEN x2 to LAD in overlapping 

fashion. 





#2 h/o recurrent Right lower extremity arterial thrombus treated at [facility]. 

Per patient he underwent stenting to right popliteal in 2011 and in 2012 had a 

recurrent arterial thrombus while on Coumadin.  He reports history of Lupus 

anticardiolipin antibody. Heme evaluated patient yesterday. It was decided to 

start Xarelto 20mg/day. Appreciate input. Will monitor closely for bleeding 

complications. Right radial access is intact, no bleeding or hematoma. 





#3 h/o HLD; LDL this admit 172. Now on Lipitor 80QHS. Will need repeat FLP/LFT 

in 6-8 weeks.





#4 HTN; on Lopressor 50mg PO BID, Captopril 6.25mg PO TID. Will increase 

Captopril to 12.5mg PO TID. 





#5 Disposition pending course. Patient full code. Will d/w Dr Jaramillo. Will 

transfer to telemetry. 


Attending: Chidi Jaramillo





<Chidi Jaramillo - Last Filed: 11/15/19 15:28>





Medications


Active Medications: 








Acetaminophen (Tylenol Tab*)  650 mg PO Q4H PRN


   PRN Reason: MILD PAIN or TEMP > 100.4


   Last Admin: 11/15/19 14:20 Dose:  650 mg


Aspirin (Aspirin 81 Mg Chew Tab*)  81 mg PO DAILY [facility]


   Last Admin: 11/15/19 08:12 Dose:  81 mg


Atorvastatin Calcium (Lipitor*)  80 mg PO DAILY@1700 [facility]


   Last Admin: 11/14/19 16:38 Dose:  80 mg


Captopril (Capoten Tab*)  12.5 mg PO TID [facility]


   Last Admin: 11/15/19 14:20 Dose:  12.5 mg


Clopidogrel Bisulfate (Plavix Tab*)  600 mg PO ONCE ONE


   Stop: 11/15/19 20:01


Clopidogrel Bisulfate (Plavix Tab*)  75 mg PO DAILY [facility]


Metoprolol Tartrate (Lopressor Tab*)  50 mg PO BID [facility]


   Last Admin: 11/15/19 08:12 Dose:  50 mg


Nicotine (Nicotine Patch 21 Mg/24 Hr*)  1 patch TRANSDERM DAILY@0800 [facility]


   Last Admin: 11/15/19 08:10 Dose:  1 patch


Nitroglycerin (Nitroglycerin Tab 0.4 Mg*)  0.4 mg SL Q5M PRN


   PRN Reason: ANGINA


   Last Admin: 11/14/19 16:46 Dose:  0.4 mg


Oxycodone/Acetaminophen (Percocet 5/325 Tab*)  1 tab PO Q6H PRN


   PRN Reason: PAIN - MODERATE


   Last Admin: 11/15/19 02:59 Dose:  1 tab


Pharmacy Profile Note (Nicotine Patch Removal Note*)  1 note PATCH OFF 2[address] [facility]


   Last Admin: 11/14/19 21:13 Dose:  1 note


Rivaroxaban (Xarelto(*))  20 mg PO 1700 [facility]


Temazepam (Restoril Cap*)  15 mg PO BEDTIME PRN


   PRN Reason: INSOMNIA


   Last Admin: 11/14/19 21:12 Dose:  15 mg








Objective


Vital Signs:











Temp Pulse Resp BP Pulse Ox


 


 98.6 F   89   21   126/87   98 


 


 11/15/19 12:00  11/15/19 14:00  11/15/19 14:00  11/15/19 12:22  11/15/19 14:00











Laboratory Results: 


 





 11/13/19 21:38 





 11/15/19 04:00 





 











INR (Anticoag Therapy)  1.00  (0.82-1.09)   11/13/19  21:38    


 


APTT  29.0 seconds (26.0-38.0)   11/13/19  21:38    


 


Total Bilirubin  0.50 mg/dL (0.2-1.0)   11/13/19  21:38    


 


AST  20 U/L (13-39)   11/13/19  21:38    


 


ALT  23 U/L (7-52)   11/13/19  21:38    


 


Alkaline Phosphatase  52 U/L ()   11/13/19  21:38    


 


CK-MB (CK-2)  296.0 ng/mL (0.6-6.3)  H  11/14/19  09:45    


 


B-Natriuretic Peptide  62 pg/mL (<=100)   11/13/19  21:38    


 


Total Protein  7.6 g/dL (6.4-8.9)   11/13/19  21:38    


 


Albumin  4.3 g/dL (3.2-5.2)   11/13/19  21:38    


 


Globulin  3.3 g/dL (2-4)   11/13/19  21:38    


 


Albumin/Globulin Ratio  1.3  (1-3)   11/13/19  21:38    


 


Triglycerides  254 mg/dL  11/13/19  21:38    


 


Cholesterol  248 mg/dL  11/13/19  21:38    


 


LDL Cholesterol  172 mg/dL  11/13/19  21:38    


 


HDL Cholesterol  25.0 mg/dL  11/13/19  21:38    








 











  11/13/19 11/14/19 11/14/19





  21:38 00:28 04:25


 


Troponin I  0.09 H*  16.85 H*  67.81 H*














  11/14/19 11/14/19 11/15/19





  09:45 16:41 09:20


 


Troponin I  > 74.00 H*  > 83.00 H*  43.35 H*














Assessment/Plan





Agree with above.

## 2019-11-16 LAB
BASOPHILS # BLD AUTO: 0 10^3/UL (ref 0–0.2)
EOSINOPHIL # BLD AUTO: 0 10^3/UL (ref 0–0.6)
HCT VFR BLD AUTO: 38 % (ref 42–52)
HGB BLD-MCNC: 12.7 G/DL (ref 14–18)
LYMPHOCYTES # BLD AUTO: 1.7 10^3/UL (ref 1–4.8)
MCH RBC QN AUTO: 30 PG (ref 27–31)
MCHC RBC AUTO-ENTMCNC: 34 G/DL (ref 31–36)
MCV RBC AUTO: 89 FL (ref 80–94)
MONOCYTES # BLD AUTO: 1.9 10^3/UL (ref 0–0.8)
NEUTROPHILS # BLD AUTO: 10.7 10^3/UL (ref 1.5–7.7)
NRBC # BLD AUTO: 0 10^3/UL
NRBC BLD QL AUTO: 0
PLATELET # BLD AUTO: 283 10^3/UL (ref 150–450)
RBC # BLD AUTO: 4.24 10^6 /UL (ref 4.18–5.48)
WBC # BLD AUTO: 14.4 10^3/UL (ref 3.5–10.8)

## 2019-11-16 RX ADMIN — METOPROLOL TARTRATE SCH MG: 50 TABLET, FILM COATED ORAL at 08:56

## 2019-11-16 RX ADMIN — WATER SCH NOTE: 100 INJECTION, SOLUTION INTRAVENOUS at 20:10

## 2019-11-16 RX ADMIN — CLOPIDOGREL SCH MG: 75 TABLET, FILM COATED ORAL at 08:56

## 2019-11-16 RX ADMIN — CAPTOPRIL SCH MG: 12.5 TABLET ORAL at 20:10

## 2019-11-16 RX ADMIN — NICOTINE SCH PATCH: 21 PATCH TRANSDERMAL at 08:55

## 2019-11-16 RX ADMIN — ATORVASTATIN CALCIUM SCH MG: 80 TABLET, FILM COATED ORAL at 15:57

## 2019-11-16 RX ADMIN — TEMAZEPAM PRN MG: 15 CAPSULE ORAL at 22:24

## 2019-11-16 RX ADMIN — METOPROLOL TARTRATE SCH MG: 50 TABLET, FILM COATED ORAL at 20:11

## 2019-11-16 RX ADMIN — NITROGLYCERIN PRN MG: 0.4 TABLET SUBLINGUAL at 16:01

## 2019-11-16 RX ADMIN — CAPTOPRIL SCH MG: 12.5 TABLET ORAL at 08:56

## 2019-11-16 RX ADMIN — ASPIRIN SCH MG: 81 TABLET, CHEWABLE ORAL at 08:56

## 2019-11-16 RX ADMIN — NITROGLYCERIN PRN MG: 0.4 TABLET SUBLINGUAL at 09:12

## 2019-11-16 RX ADMIN — RIVAROXABAN SCH MG: 20 TABLET, FILM COATED ORAL at 15:57

## 2019-11-16 RX ADMIN — CAPTOPRIL SCH MG: 12.5 TABLET ORAL at 15:58

## 2019-11-17 VITALS — DIASTOLIC BLOOD PRESSURE: 76 MMHG | SYSTOLIC BLOOD PRESSURE: 104 MMHG

## 2019-11-17 RX ADMIN — ASPIRIN SCH MG: 81 TABLET, CHEWABLE ORAL at 08:05

## 2019-11-17 RX ADMIN — NICOTINE SCH PATCH: 21 PATCH TRANSDERMAL at 08:04

## 2019-11-17 RX ADMIN — CLOPIDOGREL SCH MG: 75 TABLET, FILM COATED ORAL at 08:05

## 2019-11-17 NOTE — DS
CC:  Dr. Ramirez; Dr. Reed *

 

DISCHARGE SUMMARY:

 

DATE OF ADMISSION:  11/14/19

 

DATE OF DISCHARGE:  11/17/19

 

PRIMARY CARE PHYSICIAN:  Dr. Ramirez.

 

DISCHARGE DIAGNOSES:

1.  Anterior wall ST-elevation infarct.

2.  Thrombophilia.

3.  Tobacco use.

4.  Prediabetes.

5.  Prior right lower extremity arterial thrombosis with stenting.

6.  Hyperlipidemia.

7.  Left ventricular systolic dysfunction, acute.

8.  Hypertension.

9.  Chronic obstructive pulmonary disease.

10.  CONTRAST ALLERGY.

 

DISPOSITION:  To home.

 

DISCHARGE CONDITION:  Stable.

 

DISCHARGE MEDICATIONS:

1.  Aspirin 81 mg daily, planned for 1 month before discontinuation.

2.  Lipitor 80 mg daily.

3.  Plavix 75 mg daily.

4.  Lisinopril 10 mg daily.

5.  Toprol- mg daily.

6.  Nicotine patch 21 mg daily.

7.  Nitroglycerin 0.4 sublingual p.r.n.

8.  Rivaroxaban 20 mg daily.

 

ACTIVITY:  No strenuous exertion for the next week, to avoid heavy lifting with 
right upper extremity.

 

WOUND CARE:  Shower only for 2 days.

 

FOLLOWUP:  To call Dr. Reed's office for a followup within the next 2 weeks.  
To call our office for wrist check with Dr. Waldron next week.  To call Dr. Ramirez's office to arrange a followup visit.

 

DIET:  Low-fat, low-cholesterol, cardiac.

 

PRIOR PROCEDURES:  Cardiac cath, stent placement to LAD, 3.0 x 16 Synergy drug- 
eluting stent with distal overlapping 3.0 x 8 Synergy drug-eluting stent for 
edge dissection.

 

HISTORY:  See H and P.

 

LABORATORY DATA:  In hospital, blood sugar remained somewhat elevated, 
hemoglobin A1c was 6.1.  The patient was informed that he has prediabetes, 
implications thereof and management thereof.  He will pursue this with Dr. Ramirez. Creatinine remained stable.  His CPK peaked at 2489, MB greater 
than 300.  His troponin peaked greater than 83.  EKG on 11/15/19 demonstrated 
post infarct evolution with T-wave inversion in 1, aVL, V1 through V5 with QS 
in V1, V2, and persisting slight ST elevation of 2 mm in the right precordial 
leads. Echocardiogram on 11/14/19 reported LVEF of 45 to 50 with hypokinesis of 
the mid apical anterolateral and apical inferior left ventricle.  RV systolic 
pressure could not be estimated.  Chest x-ray showed no acute changes.  
Admission lipid profile:  Total cholesterol 248, triglycerides high at 254, LDL 
172 with direct LDL of 196, HDL low at 25.  Presenting lactate was 2.7.

 

HOSPITAL COURSE:  He presented with acute anterior ST-elevation infarct, 
underwent catheterization after premedication with IV Solu-Medrol and Benadryl 
without allergic manifestations.  LV gram was not performed due to history of 
contrast reaction manifest by rash and hives.  He had a culprit LAD lesion, 
which was stented with a 3 x 16 drug-eluting stent and a 3 x 8 overlapping drug-
eluting stent distally for distal edge dissection.  He has extensive 
atherosclerosis without other critical stenosis.  His PDA is small with diffuse 
plaquing with 50% to 60% stenosis.  Circumflex had a mid 50% stenosis.  He 
needs aggressive secondary risk factor modification and received instructions 
thereof.  He is planning to try to quit smoking, he is tolerating nicotine 
patch 21 mg daily.  He ambulated without difficulty, had no significant 
arrhythmias or heart failure.  He had recurring episodes of very brief atypical 
chest pain post infarct without any re-elevation in troponins or EKG changes.  
His right radial cath site is stable.  He is tolerating his medical regimen, 
was seen by Hematology for his history of thrombophilia.  He is being 
discharged on rivaroxaban, Plavix, and aspirin.  Our recommendation is to 
discontinue aspirin after about a month and continue his NOAC and Plavix.  He 
received full discharge instructions.  On the day of discharge, vitals are 
stable, he is asymptomatic with a patent right radial artery by reverse Juve.  
He has a few rhonchi, no rales.  Cardiac exam is unremarkable.

 

 [id number]/[facility] #: [id number]

[facility]

## 2019-11-18 NOTE — CATH
CC:  Dr. Ramirez; Chidi Jaramillo MD; Dr. Reed, Heme/Onc *

 

STENT REPORT:

 

DATE OF PROCEDURE:  11/13/19 - ROOM #446

 

PRIMARY CARE PHYSICIAN:  Dr. Ramirez.

 

PROCEDURE:  Right radial artery access, bilateral selective coronary 
cineangiography, left heart catheterization, stent placement LAD, 3.0 x 16 
Synergy drug-eluting stent, distal overlapping 3.0 x 8 Synergy drug-eluting 
stent.

 

HEMODYNAMICS:  Initial /95, /12-17, no aortic valve gradient on 
pullback.

 

HISTORY:  A 54-year-old male with history of thrombophilia with prior right 
lower extremity arterial thrombosis, previously treated with NOAC.  He has self
- discontinued all his meds.  In retrospect for about 2 weeks, he has had 
exertional dyspnea and unstable angina.  He presented with an anterior ST 
elevation infarct.

 

PROCEDURE ACCESS:  Right radial artery sheath 6F slender.

 

MEDICATIONS:

1.  Heparin 3000 units.

2.  Nitroglycerin 300 mcg.

3.  Verapamil 3 mg IA.

4.  Subcu lidocaine.

5.  IV Versed.

6.  IV fentanyl.

7.  Solu-Medrol 40 mg.

8.  Benadryl 50 mg IV for history of CONTRAST allergy with hives and rash.

9.  Brilinta 180 mg p.o. loading dose.

10.  Heparin 6000 units.

 

ANGIOGRAPHY:  RCA:  The RCA is large, dominant with diffuse nonobstructive 
atherosclerosis in its proximal portion.  The PDA is small, has diffuse 
plaquing with a mid 75% to 80% stenosis, vessel reference diameter is probably 
less than 2 mm.  The PDA is followed by a moderate posterolateral, which has 
nonobstructive luminal irregularity followed by a smaller more distal 
posterolateral.

 

Left main:  The left main has mild irregularity, no significant stenosis.

 

LAD:  The LAD has moderate calcification, it extends past the apex.  There is a 
small first diagonal, which has ostial 60% to 70% stenosis, followed almost 
immediately by an eccentric 80% to 90% stenosis.  Distal LAD reaches past the 
apex, supplies a moderate diagonal, distal LAD.  Has scattered luminal 
irregularity.

 

Circumflex:  Circumflex is moderate, not dominant with too small marginals, the 
SA adri artery, supplies a single marginal branch, which has a short 50% 
stenosis. Distally, the marginal has scattered nonobstructive plaquing.

 

After drug-eluting stent placement at the culprit lesion, straddling the small 
first diagonal, there was a distal non-occlusive edge dissection.  The diagonal 
has PARISH 1 flow, was not intervened upon because of small diameter size.

 

After a second distal overlapping drug-eluting stent placement and high 
pressure post dilatation, the LAD has PARISH 3 flow.  There is no residual 
stenosis.  First diagonal still has PARISH 1 flow.

 

CONCLUSION:

1.  Three-vessel coronary disease with culprit LAD high-grade stenosis with ST 
elevation infarct presentation.

2.  Successful stent placement to LAD.  The jailed first diagonal has PARISH 1 
flow, was not treated due to small size.  His intermediate PDA and circumflex 
stenoses will be treated medically.  If necessary, he will have outpatient 
stress imaging.

3.  Successful right radial artery access.

 

 [id number]/[facility] #: [id number]

[facility]